# Patient Record
Sex: FEMALE | Race: WHITE | NOT HISPANIC OR LATINO | Employment: FULL TIME | URBAN - METROPOLITAN AREA
[De-identification: names, ages, dates, MRNs, and addresses within clinical notes are randomized per-mention and may not be internally consistent; named-entity substitution may affect disease eponyms.]

---

## 2019-09-14 ENCOUNTER — OFFICE VISIT (OUTPATIENT)
Dept: URGENT CARE | Facility: CLINIC | Age: 69
End: 2019-09-14
Payer: COMMERCIAL

## 2019-09-14 VITALS
HEART RATE: 71 BPM | BODY MASS INDEX: 42.86 KG/M2 | RESPIRATION RATE: 18 BRPM | DIASTOLIC BLOOD PRESSURE: 90 MMHG | SYSTOLIC BLOOD PRESSURE: 142 MMHG | OXYGEN SATURATION: 98 % | WEIGHT: 227 LBS | HEIGHT: 61 IN | TEMPERATURE: 98 F

## 2019-09-14 DIAGNOSIS — B96.89 ACUTE BACTERIAL BRONCHITIS: Primary | ICD-10-CM

## 2019-09-14 DIAGNOSIS — J20.8 ACUTE BACTERIAL BRONCHITIS: Primary | ICD-10-CM

## 2019-09-14 DIAGNOSIS — H10.12 ALLERGIC CONJUNCTIVITIS OF LEFT EYE: ICD-10-CM

## 2019-09-14 PROCEDURE — 99214 PR OFFICE/OUTPT VISIT, EST, LEVL IV, 30-39 MIN: ICD-10-PCS | Mod: S$GLB,,, | Performed by: PHYSICIAN ASSISTANT

## 2019-09-14 PROCEDURE — 99214 OFFICE O/P EST MOD 30 MIN: CPT | Mod: S$GLB,,, | Performed by: PHYSICIAN ASSISTANT

## 2019-09-14 RX ORDER — BENZONATATE 100 MG/1
100 CAPSULE ORAL EVERY 6 HOURS PRN
Qty: 60 CAPSULE | Refills: 1 | Status: SHIPPED | OUTPATIENT
Start: 2019-09-14 | End: 2020-02-18

## 2019-09-14 RX ORDER — PREDNISONE 20 MG/1
TABLET ORAL
Qty: 10 TABLET | Refills: 0 | Status: SHIPPED | OUTPATIENT
Start: 2019-09-14 | End: 2020-02-18 | Stop reason: SDUPTHER

## 2019-09-14 RX ORDER — AZELASTINE HYDROCHLORIDE 0.5 MG/ML
1 SOLUTION/ DROPS OPHTHALMIC 2 TIMES DAILY
Qty: 6 ML | Refills: 0 | Status: SHIPPED | OUTPATIENT
Start: 2019-09-14 | End: 2020-02-18

## 2019-09-14 RX ORDER — DOXYCYCLINE 100 MG/1
100 CAPSULE ORAL 2 TIMES DAILY
Qty: 14 CAPSULE | Refills: 0 | Status: SHIPPED | OUTPATIENT
Start: 2019-09-14 | End: 2019-09-21

## 2019-09-14 NOTE — PATIENT INSTRUCTIONS

## 2019-09-14 NOTE — PROGRESS NOTES
"Subjective:       Patient ID: Hallie Calvo is a 69 y.o. female.    Vitals:  height is 5' 1" (1.549 m) and weight is 103 kg (227 lb). Her oral temperature is 98 °F (36.7 °C). Her blood pressure is 142/90 (abnormal) and her pulse is 71. Her respiration is 18 and oxygen saturation is 98%.     Chief Complaint: Cough and Nasal Congestion    Pt presents today with non-productive cough X's 1 month. Pt has taken OTC cough meds with no relief. Pt has albuterol inhaler she has used a few times for relief. Pt has hx of bronchitis     Cough   This is a chronic problem. The current episode started more than 1 month ago. The problem has been unchanged. The problem occurs constantly. The cough is non-productive. Associated symptoms include postnasal drip. Pertinent negatives include no chest pain, chills, ear congestion, ear pain, eye redness, fever, headaches, heartburn, hemoptysis, myalgias, nasal congestion, rash, rhinorrhea, sore throat, shortness of breath, sweats, weight loss or wheezing. Nothing aggravates the symptoms. Treatments tried: notes. The treatment provided no relief. Her past medical history is significant for bronchitis. There is no history of asthma, bronchiectasis, COPD, emphysema, environmental allergies or pneumonia.       Constitution: Negative for chills, sweating, fatigue and fever.   HENT: Positive for postnasal drip. Negative for ear pain, congestion, sinus pain, sinus pressure, sore throat and voice change.    Neck: Negative for painful lymph nodes.   Cardiovascular: Negative for chest pain.   Eyes: Negative for eye redness.   Respiratory: Positive for cough. Negative for chest tightness, sputum production, bloody sputum, COPD, shortness of breath, stridor, wheezing and asthma.    Gastrointestinal: Negative for nausea, vomiting and heartburn.   Musculoskeletal: Negative for muscle ache.   Skin: Negative for rash.   Allergic/Immunologic: Negative for environmental allergies, seasonal allergies and " asthma.   Neurological: Negative for headaches.   Hematologic/Lymphatic: Negative for swollen lymph nodes.       Objective:      Physical Exam   Constitutional: She is oriented to person, place, and time. She appears well-developed and well-nourished. She is cooperative.  Non-toxic appearance. She does not appear ill. No distress.   HENT:   Head: Normocephalic and atraumatic.   Right Ear: Hearing, tympanic membrane, external ear and ear canal normal.   Left Ear: Hearing, tympanic membrane, external ear and ear canal normal.   Nose: No mucosal edema, rhinorrhea or nasal deformity. No epistaxis. Right sinus exhibits maxillary sinus tenderness. Right sinus exhibits no frontal sinus tenderness. Left sinus exhibits maxillary sinus tenderness. Left sinus exhibits no frontal sinus tenderness.   Mouth/Throat: Uvula is midline, oropharynx is clear and moist and mucous membranes are normal. No trismus in the jaw. Normal dentition. No uvula swelling. No posterior oropharyngeal erythema.   Eyes: Lids are normal. Right eye exhibits no exudate. Left eye exhibits no exudate. Left conjunctiva is injected. No scleral icterus.   Neck: Trachea normal, full passive range of motion without pain and phonation normal. Neck supple.   Cardiovascular: Normal rate, regular rhythm, normal heart sounds, intact distal pulses and normal pulses.   Pulmonary/Chest: Effort normal and breath sounds normal. No respiratory distress.   Abdominal: Soft. Normal appearance and bowel sounds are normal. She exhibits no distension. There is no tenderness.   Musculoskeletal: Normal range of motion. She exhibits no edema or deformity.   Neurological: She is alert and oriented to person, place, and time. She exhibits normal muscle tone. Coordination normal.   Skin: Skin is warm, dry and intact. She is not diaphoretic. No pallor.   Psychiatric: She has a normal mood and affect. Her speech is normal and behavior is normal. Judgment and thought content normal.  Cognition and memory are normal.   Nursing note and vitals reviewed.      Assessment:       1. Acute bacterial bronchitis    2. Allergic conjunctivitis of left eye        Plan:         Acute bacterial bronchitis    Allergic conjunctivitis of left eye    Other orders  -     doxycycline (VIBRAMYCIN) 100 MG Cap; Take 1 capsule (100 mg total) by mouth 2 (two) times daily. for 7 days  Dispense: 14 capsule; Refill: 0  -     azelastine (OPTIVAR) 0.05 % ophthalmic solution; Place 1 drop into the right eye 2 (two) times daily.  Dispense: 6 mL; Refill: 0  -     benzonatate (TESSALON PERLES) 100 MG capsule; Take 1 capsule (100 mg total) by mouth every 6 (six) hours as needed.  Dispense: 60 capsule; Refill: 1  -     predniSONE (DELTASONE) 20 MG tablet; Take 40mg for 2days, take 30mg for 2 days, take 20mg for 2 days, take 10mg for 2 days  Dispense: 10 tablet; Refill: 0     Discussed risks versus benefits of steroids including but not limited to:  Increased intra-ocular pressure, increased blood sugars, elevated blood pressure, arrhythmia, decreased immune system.  Patient would like to proceed.    You must understand that you've received an Urgent Care treatment only and that you may be released before all your medical problems are known or treated. You, the patient, will arrange for follow up care as instructed.  Follow up with your PCP or specialty clinic as directed in the next 1-2 weeks if not improved or as needed.  You can call [phone] to schedule an appointment with the appropriate provider.  If your condition worsens we recommend that you receive another evaluation at the emergency room immediately or contact your primary medical clinics after hours call service to discuss your concerns.  Please return here or go to the Emergency Department for any concerns or worsening of condition.

## 2019-10-29 PROBLEM — E66.01 CLASS 3 SEVERE OBESITY WITH BODY MASS INDEX (BMI) OF 40.0 TO 44.9 IN ADULT: Status: ACTIVE | Noted: 2019-10-29

## 2019-10-29 PROBLEM — E66.813 CLASS 3 SEVERE OBESITY WITH BODY MASS INDEX (BMI) OF 40.0 TO 44.9 IN ADULT: Status: ACTIVE | Noted: 2019-10-29

## 2020-05-13 ENCOUNTER — PATIENT MESSAGE (OUTPATIENT)
Dept: ADMINISTRATIVE | Facility: OTHER | Age: 70
End: 2020-05-13

## 2020-09-22 ENCOUNTER — PATIENT OUTREACH (OUTPATIENT)
Dept: OTHER | Facility: OTHER | Age: 70
End: 2020-09-22

## 2020-09-22 NOTE — LETTER
September 22, 2020     Hallie Calvo  [address] [facility] [address]       Dear Hallie,    Welcome to [facility]! Our goal is to make care effective, proactive and convenient by using data you send us from home to better treat your chronic conditions.                My name is Josephine Severino, and I am your dedicated Digital Medicine clinician. As an expert in medication management, I will help ensure that the medications you are taking continue to provide the intended benefits and help you reach your goals. You can reach me directly at [phone] or by sending me a message directly through your [facility] account.      I am Kiersten Melendez and I will be your health . My job is to help you identify lifestyle changes to improve your disease control. We will talk about nutrition, exercise, and other ways you may be able to adjust your current habits to better your health. Additionally, we will help ensure you are completing the tests and screenings that are necessary to help manage your conditions. You can reach me directly at [phone] or by sending me a message directly through your [facility] account.    Most importantly, YOU are at the center of this team. Together, we will work to improve your overall health and encourage you to meet your goals for a healthier lifestyle.     What we expect from YOU:  · Please take frequent home blood pressure measurements. We ask that you take at least 1 blood pressure reading per week, but more information will better help us get you know you. Be sure you rest for a few minutes before taking the reading in a quiet, comfortable place.     Be available to receive phone calls or [employer] messages, when appropriate, from your care team. Please let us know if there are any specific days or times that work best for us to reach you via phone.     Complete routine tests and screenings. Dont worry, we will help keep you on track!           What you should expect  from your Digital Medicine Care Team:   We will work with you to create a personalized plan of care and provide you with encouragement and education, including regarding lifestyle changes, that could help you manage your disease states.     We will adjust your current medications, if needed, and continue to monitor your long-term progress.     We will provide you and your physician with monthly progress reports after you have been in the program for more than 30 days.     We will send you reminders through [employer] and text messages to help ensure you do not miss any testing deadlines to help manage your disease states.    You will be able to reach us by phone or through your [employer] account by clicking our names under Care Team on the right side of the home screen.    We look forward to working with you to help manage your health,    Sincerely,    Your Digital Medicine Team    Please visit our websites to learn more:   · Hypertension: [email or web address]      Remember, we are not available for emergencies. If you have an emergency, please contact your doctors office directly or call [facility] on-[facility] ([phone] or [phone]) or 911.

## 2020-09-22 NOTE — PROGRESS NOTES
Digital Medicine: Health  Introduction    Introduced Hallie Calvo to Digital Medicine. Discussed health  role and recommended lifestyle modifications.    The history is provided by the patient.           Additional Enrollment Details: Patient reports that the blood pressure cuff pumps up too tight. 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 She feels that she's not putting it on too tight. It is really painful when she takes her reading. She is not sure she can take it. She will try to take it 2-3 times a week if she can.     HYPERTENSION  Explained hypertension digital medicine goals including BP goal less than or equal to 130/80mmHg, improved convenience of BP management and reduced risk of heart attack, kidney failure, stroke, eye disease, dementia, and death.      Explained non-pharmacologic therapies like low salt diet and physical activity can reduce blood pressure. .      Explained that we expect patient to submit several blood pressure readings per week at random times of the day, but at least 30 minutes after taking blood pressure medications. Instructed patient not to allow anyone else to use their blood pressure monitor and phone as data submitted is directly entered into medical record. Reviewed and confirmed appropriate blood pressure monitoring technique.         Patient's BP goal is less than or equal to 130/80.Patient's BP average is 158/75 mmHg, which is above goal, per 2017 ACC/AHA Hypertension Guidelines.    Explained to the patient that the Digital Medicine team is not available for emergencies. Advised patient call [facility] On Call ([phone] or [phone]) or [phone] if needed.               Diet-Assessed      Additional diet details: Encouraged to decrease sodium intake to <2,000 mg per day.       Physical Activity-Assessed      Additional physical activity details: Encouraged to increase exercise to at least 150 minutes per week.        Medication Adherence-Medication  Adherence not addressed.      Substance, Sleep, Stress-Not assessed      Provided patient education.  Reviewed Device Techniques.     Addressed patient questions and patient has my contact information if needed prior to next outreach. Patient verbalizes understanding.      Explained the importance of self-monitoring and medication adherence. Encouraged the patient to communicate with their health  for lifestyle modifications to help improve or maintain a healthy lifestyle.        Explained to the patient that the Digital Medicine team is not available for emergencies. Advised patient call [facility] ([phone] or [phone]) or 911 if needed.         There are no preventive care reminders to display for this patient.    Last 5 Patient Entered Readings                                      Current 30 Day Average: 158/75     Recent Readings 9/19/2020 9/18/2020    SBP (mmHg) 173 142    DBP (mmHg) 86 64    Pulse 75 67

## 2020-09-23 ENCOUNTER — PATIENT OUTREACH (OUTPATIENT)
Dept: OTHER | Facility: OTHER | Age: 70
End: 2020-09-23

## 2020-09-23 NOTE — PROGRESS NOTES
"Digital Medicine: Clinician Introduction    Hallie Calvo is a 70 y.o. female who is newly enrolled in the Digital Medicine Clinic.    Called patient regarding HDMP (Hypertension Digital Medicine Program)    HPI  Patient's BP average is currently 158/75 mmHg.    Patient states her cuff squeezes her arm too tightly even though it is the extra large because she has "big, flabby arms". She has another machine at home.     Patient is taking Propranolol 60 mg every evening. She is on it for tremors and BP. She states her tremor is "not that bad" but she used to be on atenolol for BP and was switched to propranolol. Her BP in the office is within goal.    The history is provided by the patient.      Review of patient's allergies indicates:  No Known Allergies  Completed Medication Reconciliation  Verified pharmacy information.    HYPERTENSION  Explained hypertension digital medicine goals including BP goal less than or equal to 130/80mmHg, improved convenience of BP management and reduced risk of heart attack, kidney failure, stroke, eye disease, dementia, and death.     Explained non-pharmacologic therapies like low salt diet and physical activity can reduce blood pressure.       Explained that we expect patient to submit several blood pressure readings per week at random times of the day, but at least 30 minutes after taking blood pressure medications. Instructed patient not to allow anyone else to use their blood pressure monitor and phone as data submitted is directly entered into medical record. Reviewed and confirmed appropriate blood pressure monitoring technique.         Reviewed signs/symptoms of hypertension (headache, changes in vision, chest pain, shortness of breath)   Reviewed signs/symptoms of hypotension (lightheaded, dizziness, weakness)     Patient's BP goal is less than or equal to 130/80. Patients BP average is 158/75 mmHg, which is above goal, per 2017 ACC/AHA Hypertension Guidelines.       Last 5 " Patient Entered Readings                                      Current 30 Day Average: 158/75     Recent Readings 9/19/2020 9/18/2020    SBP (mmHg) 173 142    DBP (mmHg) 86 64    Pulse 75 67                Depression Screening  Hallie Calvo did not answer the depression screening. She is in treatment for depression and is currently taking medication Patient feels that depression symptoms are currently controlled.   She is currently on Venlafaxine    Sleep Apnea Screening  Patient previously diagnosed with EVELYN and is not interested in a referral at this time.     She reports she is currently using CPAP for 5 hour(s) per night. EVELYN is managed effectively with CPAP use.  She uses her CPAP from 4-8 hours/night.     Medication Affordability Screening  Patient did not answer the medication affordability questionnaires. Patient is currently not having problems affording medications    Medication Adherence-Medication adherence was assessed.  Patient continue taking medication as prescribed.            ASSESSMENT(S)  Patients BP average is 158/75 mmHg, which is above goal. Patient's BP goal is less than or equal to 130/80 per 2017 ACC/AHA Hypertension Guidelines.     Hypertension Plan  Additional monitoring needed.  Continue current therapy.  Continue current diet/physical activity routine.  Provided patient education. Counseled patient to check with her manual machine and to enter the readings  Will call patient in a few weeks, sooner if needed. Patient knows to reach out at any time for any questions or concerns.        Addressed patient questions and patient has my contact information if needed prior to next outreach. Patient verbalizes understanding.      Explained the importance of self-monitoring and medication adherence. Encouraged the patient to communicate with their health  for lifestyle modifications to help improve or maintain a healthy lifestyle.        Sent link to [facility] [employer] webpages and  my contact information via [employer] for future questions.        Explained to the patient that the Digital Medicine team is not available for emergencies. Advised patient call [facility] ([phone] or [phone]) or [phone] if needed.           There are no preventive care reminders to display for this patient.     Current Medication Regimen:  Hypertension Medications             propranolol (INDERAL LA) 60 MG 24 hr capsule Take 1 capsule (60 mg total) by mouth once daily.          Josephine Severino, PharmD  Digital Medicine Clinical Pharmacist  [phone]

## 2020-10-14 ENCOUNTER — PATIENT OUTREACH (OUTPATIENT)
Dept: OTHER | Facility: OTHER | Age: 70
End: 2020-10-14

## 2020-10-27 ENCOUNTER — PATIENT OUTREACH (OUTPATIENT)
Dept: OTHER | Facility: OTHER | Age: 70
End: 2020-10-27

## 2020-10-27 DIAGNOSIS — I10 ESSENTIAL HYPERTENSION: Primary | ICD-10-CM

## 2020-10-27 NOTE — PROGRESS NOTES
Attempted to reach patient for routine follow up. Reviewed available blood pressure readings and labs. Per 2017 ACC/AHA Hypertension Guidelines, current 30-day average is 132/73 and is not at goal.     Last 5 Patient Entered Readings                                      Current 30 Day Average: 132/73     Recent Readings 10/25/2020 10/25/2020 10/21/2020 10/13/2020 10/9/2020    SBP (mmHg) 142 135 135 128 125    DBP (mmHg) 80 76 76 72 61    Pulse 94 61 61 66 73          BMP  Lab Results   Component Value Date     (L) 02/28/2020    K 4.6 02/28/2020     02/28/2020    CO2 26 02/28/2020    BUN 32 (H) 02/28/2020    CREATININE 0.96 02/28/2020    CALCIUM 9.2 02/28/2020    ANIONGAP 5 (L) 02/28/2020    ESTGFRAFRICA >60 02/28/2020    EGFRNONAA >60 02/28/2020       Left a voicemail and requested patient call back.    Will continue to monitor regularly. Will follow up in 2 weeks, sooner if blood pressure begins to trend up.    Josephine Severino, PharmD  Digital Medicine Clinical Pharmacist  [phone]

## 2020-11-24 RX ORDER — OLMESARTAN MEDOXOMIL 5 MG/1
5 TABLET ORAL DAILY
Qty: 30 TABLET | Refills: 5 | Status: SHIPPED | OUTPATIENT
Start: 2020-11-24 | End: 2021-03-24

## 2020-11-24 NOTE — PROGRESS NOTES
Digital Medicine: Clinician Follow-Up    Called patient for routine follow up regarding HDMP (Hypertension Digital Medicine Program)    HPI  Patients BP average is currently 133/75 mmHg.      The history is provided by the patient.      Review of patient's allergies indicates:  No Known Allergies  Follow-up reason(s): routine follow up.     Hypertension    Readings are trending down   Patient is not experiencing signs/symptoms of hypotension.  Patient is not experiencing signs/symptoms of hypertension.            Last 5 Patient Entered Readings                                      Current 30 Day Average: 133/75     Recent Readings 11/16/2020 11/15/2020 11/7/2020 11/2/2020 11/1/2020    SBP (mmHg) 129 122 150 120 132    DBP (mmHg) 82 74 66 67 81    Pulse 60 70 81 68 75                 Depression Screening  Did not address depression screening.    Sleep Apnea Screening    Did not address sleep apnea screening.     Medication Affordability Screening  Did not address medication affordability screening.     Medication Adherence-Medication adherence was assessed.          ASSESSMENT(S)  Patients BP average is 133/75 mmHg, which is above goal. Patient's BP goal is less than or equal to 130/80.     Hypertension Plan  Hypertension Medication Change. Initiate olmesartan 5 mg daily  Additional monitoring needed.  Continue current diet/physical activity routine.  Will call patient in a few weeks, sooner if needed. Patient knows to reach out at any time for any questions or concerns.        Addressed patient questions and patient has my contact information if needed prior to next outreach. Patient verbalizes understanding.             There are no preventive care reminders to display for this patient.  There are no preventive care reminders to display for this patient.      Hypertension Medications             propranoloL (INDERAL LA) 60 MG 24 hr capsule TAKE 1 CAPSULE BY MOUTH once daily          Josephine Severino, PharmD  Digital  Medicine Clinician  [phone]

## 2020-12-02 NOTE — PROGRESS NOTES
Digital Medicine: Health  Follow-Up    The history is provided by the patient.             Reason for review: Blood pressure at goal        Topics Covered on Call: physical activity and Diet    Additional Follow-up details: Average blood pressure 128/70. Blood pressure is below goal and stable.             Diet-Change    Patient reports eating or drinking the following: She hasn't been working on anything specific with her diet. They do a lot of pre packaged foods and processed foods. She does cook a lot at home. She used to watch her salt intake and will try to do that more. She gets some water during the day but mostly drinks coffee. Recommend decreasing coffee intake and increase water. She tries to incorporate protein and vegetables with her meals.       Physical Activity-Change      Additional physical activity details: She is not getting any exercise in. She is not motivated at all. She doesn't like walking. Her daughter is coming into town on the 10th and will make her exercise.       Medication Adherence-Medication adherence was asssessed.    Patient reported missing medication: once a week.    Patient identified the following reasons for non-adherence:  Patient forgets.        She will miss her medication once a week because she forgets and sometimes just doesn't want to take it.     Substance, Sleep, Stress-change  stress-not assessed  Details:  Intervention(s):    Sleep-not assessed  Details:  Intervention(s):    Alcohol -assessed  Details:She will have a drink three times a week.   Intervention(s):    Tobacco-Assessed  Details:No tobacco use   Intervention(s):          Provided patient education.       Addressed patient questions and patient has my contact information if needed prior to next outreach. Patient verbalizes understanding.             There are no preventive care reminders to display for this patient.      Last 5 Patient Entered Readings                                      Current 30  Day Average: 128/70     Recent Readings 11/29/2020 11/24/2020 11/16/2020 11/15/2020 11/7/2020    SBP (mmHg) 121 123 129 122 150    DBP (mmHg) 64 69 82 74 66    Pulse 72 65 60 70 81

## 2020-12-08 ENCOUNTER — PATIENT OUTREACH (OUTPATIENT)
Dept: OTHER | Facility: OTHER | Age: 70
End: 2020-12-08

## 2020-12-08 NOTE — PROGRESS NOTES
Digital Medicine: Clinician Follow-Up    Called patient for routine follow up regarding HDMP (Hypertension Digital Medicine Program) and follow up on olmesartan initiation.    HPI  Patients BP average is currently 119/72 mmHg.      The history is provided by the patient.   Follow-up reason(s): medication change follow-up.     Hypertension    Readings are trending down due to medication adherence.       Patient is not experiencing signs/symptoms of hypotension.  Patient is not experiencing signs/symptoms of hypertension.      Additional Follow-up details: Patient takes olmesartan 5 mg every evening. She reports no side effects or concerns.     Patient did make medication change.    Is patient tolerating med change? yes            Last 5 Patient Entered Readings                                      Current 30 Day Average: 119/72     Recent Readings 12/5/2020 11/29/2020 11/24/2020 11/16/2020 11/15/2020    SBP (mmHg) 99 121 123 129 122    DBP (mmHg) 72 64 69 82 74    Pulse 69 72 65 60 70                 Depression Screening  Did not address depression screening.    Sleep Apnea Screening    Did not address sleep apnea screening.     Medication Affordability Screening  Did not address medication affordability screening.     Medication Adherence-Medication adherence was assessed.          ASSESSMENT(S)  Patients BP average is 119/72 mmHg, which is at goal. Patient's BP goal is less than or equal to 130/80.    Hypertension Plan  Continue current therapy.  Continue current diet/physical activity routine.  Provided patient education. Counseled patient on hypotension and its treatment and encouraged her to let me know if it occurs  Will call patient in a few months, sooner if needed. Patient knows to reach out at any time for any questions or concerns.        Addressed patient questions and patient has my contact information if needed prior to next outreach. Patient verbalizes understanding.             There are no  preventive care reminders to display for this patient.  There are no preventive care reminders to display for this patient.      Hypertension Medications             olmesartan (BENICAR) 5 MG Tab Take 1 tablet (5 mg total) by mouth once daily.    propranoloL (INDERAL LA) 60 MG 24 hr capsule TAKE 1 CAPSULE BY MOUTH once daily        Josephine Severino, PharmD  Digital Medicine Clinician  [phone]

## 2021-03-24 DIAGNOSIS — I10 ESSENTIAL HYPERTENSION: ICD-10-CM

## 2021-03-24 RX ORDER — OLMESARTAN MEDOXOMIL 5 MG/1
TABLET ORAL
Qty: 30 TABLET | Refills: 5 | Status: SHIPPED | OUTPATIENT
Start: 2021-03-24 | End: 2021-10-22

## 2021-05-11 PROBLEM — R15.9 FECAL INCONTINENCE DUE TO ANORECTAL DISORDER: Status: ACTIVE | Noted: 2021-05-11

## 2021-05-11 PROBLEM — K62.9 FECAL INCONTINENCE DUE TO ANORECTAL DISORDER: Status: ACTIVE | Noted: 2021-05-11

## 2021-06-04 ENCOUNTER — TELEPHONE (OUTPATIENT)
Dept: SPINE | Facility: CLINIC | Age: 71
End: 2021-06-04

## 2021-06-15 ENCOUNTER — OFFICE VISIT (OUTPATIENT)
Dept: SPINE | Facility: CLINIC | Age: 71
End: 2021-06-15
Payer: COMMERCIAL

## 2021-06-15 VITALS
BODY MASS INDEX: 44.69 KG/M2 | HEIGHT: 61 IN | WEIGHT: 236.69 LBS | DIASTOLIC BLOOD PRESSURE: 87 MMHG | HEART RATE: 62 BPM | SYSTOLIC BLOOD PRESSURE: 137 MMHG

## 2021-06-15 DIAGNOSIS — M51.36 LUMBAR DEGENERATIVE DISC DISEASE: ICD-10-CM

## 2021-06-15 DIAGNOSIS — R42 DIZZINESS: Primary | ICD-10-CM

## 2021-06-15 DIAGNOSIS — M54.50 LEFT-SIDED LOW BACK PAIN WITHOUT SCIATICA, UNSPECIFIED CHRONICITY: ICD-10-CM

## 2021-06-15 DIAGNOSIS — R25.1 TREMOR: ICD-10-CM

## 2021-06-15 PROCEDURE — 99999 PR PBB SHADOW E&M-EST. PATIENT-LVL V: CPT | Mod: PBBFAC,,, | Performed by: PHYSICIAN ASSISTANT

## 2021-06-15 PROCEDURE — 99999 PR PBB SHADOW E&M-EST. PATIENT-LVL V: ICD-10-PCS | Mod: PBBFAC,,, | Performed by: PHYSICIAN ASSISTANT

## 2021-06-15 PROCEDURE — 99243 OFF/OP CNSLTJ NEW/EST LOW 30: CPT | Mod: S$GLB,,, | Performed by: PHYSICIAN ASSISTANT

## 2021-06-15 PROCEDURE — 99243 PR OFFICE CONSULTATION,LEVEL III: ICD-10-PCS | Mod: S$GLB,,, | Performed by: PHYSICIAN ASSISTANT

## 2021-06-15 RX ORDER — CETIRIZINE HYDROCHLORIDE 10 MG/1
10 TABLET ORAL NIGHTLY
COMMUNITY

## 2021-06-15 RX ORDER — CHOLECALCIFEROL (VITAMIN D3) 25 MCG
1000 TABLET ORAL NIGHTLY
COMMUNITY

## 2021-07-16 ENCOUNTER — TELEPHONE (OUTPATIENT)
Dept: NEUROLOGY | Facility: CLINIC | Age: 71
End: 2021-07-16

## 2021-07-20 RX ORDER — ESTRADIOL 0.1 MG/G
CREAM VAGINAL
COMMUNITY
Start: 2020-12-04 | End: 2021-07-20 | Stop reason: SDUPTHER

## 2021-07-21 RX ORDER — ESTRADIOL 0.1 MG/G
CREAM VAGINAL
Qty: 42.5 TUBE | Refills: 0 | Status: SHIPPED | OUTPATIENT
Start: 2021-07-21 | End: 2022-06-17 | Stop reason: SDUPTHER

## 2021-07-21 RX ORDER — NYSTATIN AND TRIAMCINOLONE ACETONIDE 100000; 1 [USP'U]/G; MG/G
CREAM TOPICAL
Qty: 30 G | Refills: 0 | Status: SHIPPED | OUTPATIENT
Start: 2021-07-21 | End: 2023-06-19 | Stop reason: SDUPTHER

## 2021-12-07 ENCOUNTER — PATIENT MESSAGE (OUTPATIENT)
Dept: SPINE | Facility: CLINIC | Age: 71
End: 2021-12-07
Payer: COMMERCIAL

## 2021-12-07 DIAGNOSIS — R42 DIZZINESS: Primary | ICD-10-CM

## 2021-12-07 DIAGNOSIS — R25.1 TREMOR: ICD-10-CM

## 2021-12-08 ENCOUNTER — PATIENT MESSAGE (OUTPATIENT)
Dept: SPINE | Facility: CLINIC | Age: 71
End: 2021-12-08
Payer: COMMERCIAL

## 2022-02-11 ENCOUNTER — OFFICE VISIT (OUTPATIENT)
Dept: NEUROLOGY | Facility: CLINIC | Age: 72
End: 2022-02-11
Payer: COMMERCIAL

## 2022-02-11 VITALS
RESPIRATION RATE: 18 BRPM | HEIGHT: 61 IN | SYSTOLIC BLOOD PRESSURE: 146 MMHG | WEIGHT: 238 LBS | DIASTOLIC BLOOD PRESSURE: 76 MMHG | TEMPERATURE: 98 F | HEART RATE: 68 BPM | BODY MASS INDEX: 44.93 KG/M2

## 2022-02-11 DIAGNOSIS — G47.33 OBSTRUCTIVE SLEEP APNEA: ICD-10-CM

## 2022-02-11 DIAGNOSIS — R42 DIZZINESS AND GIDDINESS: ICD-10-CM

## 2022-02-11 PROCEDURE — 3078F DIAST BP <80 MM HG: CPT | Mod: CPTII,S$GLB,, | Performed by: NURSE PRACTITIONER

## 2022-02-11 PROCEDURE — 4010F PR ACE/ARB THEARPY RXD/TAKEN: ICD-10-PCS | Mod: CPTII,S$GLB,, | Performed by: NURSE PRACTITIONER

## 2022-02-11 PROCEDURE — 1160F PR REVIEW ALL MEDS BY PRESCRIBER/CLIN PHARMACIST DOCUMENTED: ICD-10-PCS | Mod: CPTII,S$GLB,, | Performed by: NURSE PRACTITIONER

## 2022-02-11 PROCEDURE — 1160F RVW MEDS BY RX/DR IN RCRD: CPT | Mod: CPTII,S$GLB,, | Performed by: NURSE PRACTITIONER

## 2022-02-11 PROCEDURE — 3008F BODY MASS INDEX DOCD: CPT | Mod: CPTII,S$GLB,, | Performed by: NURSE PRACTITIONER

## 2022-02-11 PROCEDURE — 1126F AMNT PAIN NOTED NONE PRSNT: CPT | Mod: CPTII,S$GLB,, | Performed by: NURSE PRACTITIONER

## 2022-02-11 PROCEDURE — 99999 PR PBB SHADOW E&M-EST. PATIENT-LVL V: CPT | Mod: PBBFAC,,, | Performed by: NURSE PRACTITIONER

## 2022-02-11 PROCEDURE — 1159F PR MEDICATION LIST DOCUMENTED IN MEDICAL RECORD: ICD-10-PCS | Mod: CPTII,S$GLB,, | Performed by: NURSE PRACTITIONER

## 2022-02-11 PROCEDURE — 99202 OFFICE O/P NEW SF 15 MIN: CPT | Mod: S$GLB,,, | Performed by: NURSE PRACTITIONER

## 2022-02-11 PROCEDURE — 3078F PR MOST RECENT DIASTOLIC BLOOD PRESSURE < 80 MM HG: ICD-10-PCS | Mod: CPTII,S$GLB,, | Performed by: NURSE PRACTITIONER

## 2022-02-11 PROCEDURE — 1159F MED LIST DOCD IN RCRD: CPT | Mod: CPTII,S$GLB,, | Performed by: NURSE PRACTITIONER

## 2022-02-11 PROCEDURE — 3288F PR FALLS RISK ASSESSMENT DOCUMENTED: ICD-10-PCS | Mod: CPTII,S$GLB,, | Performed by: NURSE PRACTITIONER

## 2022-02-11 PROCEDURE — 4010F ACE/ARB THERAPY RXD/TAKEN: CPT | Mod: CPTII,S$GLB,, | Performed by: NURSE PRACTITIONER

## 2022-02-11 PROCEDURE — 3008F PR BODY MASS INDEX (BMI) DOCUMENTED: ICD-10-PCS | Mod: CPTII,S$GLB,, | Performed by: NURSE PRACTITIONER

## 2022-02-11 PROCEDURE — 3077F SYST BP >= 140 MM HG: CPT | Mod: CPTII,S$GLB,, | Performed by: NURSE PRACTITIONER

## 2022-02-11 PROCEDURE — 3288F FALL RISK ASSESSMENT DOCD: CPT | Mod: CPTII,S$GLB,, | Performed by: NURSE PRACTITIONER

## 2022-02-11 PROCEDURE — 1101F PT FALLS ASSESS-DOCD LE1/YR: CPT | Mod: CPTII,S$GLB,, | Performed by: NURSE PRACTITIONER

## 2022-02-11 PROCEDURE — 99999 PR PBB SHADOW E&M-EST. PATIENT-LVL V: ICD-10-PCS | Mod: PBBFAC,,, | Performed by: NURSE PRACTITIONER

## 2022-02-11 PROCEDURE — 1101F PR PT FALLS ASSESS DOC 0-1 FALLS W/OUT INJ PAST YR: ICD-10-PCS | Mod: CPTII,S$GLB,, | Performed by: NURSE PRACTITIONER

## 2022-02-11 PROCEDURE — 99202 PR OFFICE/OUTPT VISIT, NEW, LEVL II, 15-29 MIN: ICD-10-PCS | Mod: S$GLB,,, | Performed by: NURSE PRACTITIONER

## 2022-02-11 PROCEDURE — 3077F PR MOST RECENT SYSTOLIC BLOOD PRESSURE >= 140 MM HG: ICD-10-PCS | Mod: CPTII,S$GLB,, | Performed by: NURSE PRACTITIONER

## 2022-02-11 PROCEDURE — 1126F PR PAIN SEVERITY QUANTIFIED, NO PAIN PRESENT: ICD-10-PCS | Mod: CPTII,S$GLB,, | Performed by: NURSE PRACTITIONER

## 2022-02-11 NOTE — PROGRESS NOTES
NEUROLOGY  Outpatient CONSULT    [facility]  [address] [facility], [place] [address]  [phone] (office) / [phone] (fax)    Patient Name:  Hallie Calvo  :  1950  MR #:  [id number]  Acct #:  [id number]    Date of Neurology Consult: 2022  Name of Provider: SONNY Zaidi    Other Physicians:  Henrry Jacobo MD (Primary Care Physician); Morelia España PA-C (Referring)      Chief Complaint: Dizziness      History of Present Illness (HPI):  Hallie Calvo is a right handed 71 y.o. female.   Patient is here today for dizziness. Patient states it started a couple of months ago. She seems to think it can be positional but sometimes she could be sitting or standing. It has decreased in frequency overall. She does report that her vision has gotten worse. She denies spinning sensation but was wobbly on occasion. She has never fallen or passed out from dizziness. She did endorse occasional nausea. She was diagnosed with occular migraines in the past but this was intermittent and since resolved. The episodes vary from 5 secs to 10 minutes. She is unsure of any exacerbating factors and states alleviating factors include time as symptoms will resolve on their own.           Past Medical, Surgical, Family & Social History:   Past Medical History:   Diagnosis Date    Age-related nuclear cataract, bilateral 2016    Anemia     Arthritis     Chronic Left Knee Pain     Depressive disorder, not elsewhere classified     Hemorrhoids     [facility]    HTN (hypertension)     Hyperlipidemia     Insomnia     Left Leg Pain     Nonspecific abnormal electrocardiogram (ECG) (EKG)     Obstructive sleep apnea (adult) (pediatric)     uses cpap    Pica      Past Surgical History:   Procedure Laterality Date    BELT ABDOMINOPLASTY      CATARACT EXTRACTION      COLONOSCOPY N/A 2/10/2022    Procedure: COLONOSCOPY;  Surgeon: Avi Herman Jr., MD;  Location: [facility]  ENDO;  Service: Endoscopy;  Laterality: N/A;    COLONOSCOPY W/ POLYPECTOMY  2015    [facility]    CYSTOCELE REPAIR      ESOPHAGOGASTRODUODENOSCOPY      ESOPHAGOGASTRODUODENOSCOPY  10/2016    GASTRIC BYPASS      NEPHRECTOMY Left 1998    ORTHOPEDIC SURGERY Right 1990    x 5 for tib/fib fx right    steroid epidural  1998    x 2    TONSILLECTOMY, ADENOIDECTOMY      UMBILICAL HERNIA REPAIR      VENTRAL HERNIA REPAIR       Family History   Problem Relation Age of Onset    Stroke Father     Hypertension Father     Hyperlipidemia Father     Diabetes Father     Heart disease Father     Arthritis Father     Kidney disease Father     Thyroid disease Mother     Heart disease Mother     Alzheimer's disease Mother     Arthritis Mother     Depression Mother     Stomach cancer Maternal Grandmother     Diabetes Sister     Depression Sister     Arthritis Sister     Diabetes Brother     Depression Brother     Frontotemporal dementia Sister     Arthritis Sister     Arthritis Brother     COPD Brother     Ovarian cancer Maternal Aunt 40     Alcohol use:  reports current alcohol use.   (Of note, 0.6 oz = 1 beer or 6 oz = 10 beers).  Tobacco use:  reports that she has never smoked. She has never used smokeless tobacco.  Street drug use:  reports no history of drug use.  Allergies: Patient has no known allergies..    Home Medications:     Current Outpatient Medications:     BIOTIN ORAL, biotin  1,000 mg daily, Disp: , Rfl:     calcium carbonate (OS-NAVEEN) 600 mg calcium (1,500 mg) Tab, Calcium 600, Disp: , Rfl:     cetirizine (ZYRTEC) 10 MG tablet, Take 10 mg by mouth every evening., Disp: , Rfl:     esomeprazole (NEXIUM) 40 MG capsule, TAKE 1 CAPSULE BY MOUTH DAILY, Disp: 30 capsule, Rfl: 0    estradioL (ESTRACE) 0.01 % (0.1 mg/gram) vaginal cream, Insert 1 gram vaginally 3 x every week, Disp: 42.5 Tube, Rfl: 0    ferrous sulfate 325 mg (65 mg iron) Tab tablet, Take 325 mg by  "mouth daily with breakfast., Disp: , Rfl:     KRILL OIL ORAL, Take 1 capsule by mouth every evening., Disp: , Rfl:     meloxicam (MOBIC) 15 MG tablet, Take 1 tablet (15 mg total) by mouth daily as needed for Pain., Disp: 90 tablet, Rfl: 1    multivitamin (THERAGRAN) per tablet, Take 1 tablet by mouth once daily., Disp: , Rfl:     nystatin-triamcinolone (MYCOLOG II) cream, Apply to affected area 2 times daily, Disp: 30 g, Rfl: 0    olmesartan (BENICAR) 5 MG Tab, TAKE 1 TABLET BY MOUTH once daily, Disp: 30 tablet, Rfl: 5    propranoloL (INDERAL LA) 60 MG 24 hr capsule, TAKE 1 CAPSULE BY MOUTH once daily, Disp: 90 capsule, Rfl: 3    sucralfate (CARAFATE) 1 gram tablet, TAKE 1 TABLET BY MOUTH BEFORE BREAKFAST & AT BEDTIME, Disp: 90 tablet, Rfl: 3    temazepam (RESTORIL) 15 mg Cap, TAKE 1 CAPSULE BY MOUTH nightly AS NEEDED, Disp: 30 capsule, Rfl: 2    TURMERIC ORAL, Take 1 capsule by mouth every evening., Disp: , Rfl:     venlafaxine (EFFEXOR-XR) 150 MG Cp24, TAKE 1 CAPSULE BY MOUTH EVERY DAY, Disp: 90 capsule, Rfl: 1    vitamin D (VITAMIN D3) 1000 units Tab, Take 1,000 Units by mouth once daily., Disp: , Rfl:   No current facility-administered medications for this visit.    Physical Examination:  BP (!) 146/76 (BP Location: Right arm, Patient Position: Sitting, BP Method: Medium (Automatic))   Pulse 68   Temp 97.6 °F (36.4 °C) (Skin)   Resp 18   Ht 5' 1" (1.549 m)   Wt 108 kg (237 lb 15.8 oz)   BMI 44.97 kg/m²     GENERAL:  General appearance: Well, non-toxic appearing.  No apparent distress.  Neck: supple.  .    MENTAL STATUS:  Alertness, attention span & concentration: normal.  Language: normal.  Orientation to self, place & time:  normal.  Memory, recent & remote: normal.  Fund of knowledge: normal.      SPEECH:  Clear and fluent.  Follows complex commands.    CRANIAL NERVES:  Cranial Nerves II-XII were examined.  II - Visual fields: normal.  III, IV, VI: PERRL, EOMI, No ptosis, No nystagmus.  V - " Facial sensation: normal.  VII - Face symmetry & mobility: Due to Covid-19 recommended precautions, patient wearing facial mask; mouth and nose not examined.  VIII - Hearing: normal.  IX, X - Palate: Due to Covid-19 recommended precautions, patient wearing facial mask; mouth and nose not examined.  XI - Shoulder shrug: normal.  XII - Tongue protrusion: Due to Covid-19 recommended precautions, patient wearing facial mask; mouth and nose not examined.    GROSS MOTOR:  Gait & station: non focal; fair tandem  Tone: normal.  Abnormal movements: none.  Finger-nose: normal.  Rapid alternating movements: normal.  Pronator drift: normal      MUSCLE STRENGTH:     Fascics Atrophy RIGHT    LEFT Atrophy Fascics     5 Neck Ext. 5       5 Neck Flex 5       5 Biceps 5       5 Triceps 5       5 Forearm.Pr. 5                5 Iliopsoas flex    5       5 Hip Abduct 5       5 Hip Adduct 5       5 Quads 5       5 Hams 5       5 Dorsiflex 5       5 Plantar Flex 5       REFLEXES:    RIGHT Reflex   LEFT   2+ Biceps 2+   2+ Brachiorad. 2+        2+ Patellar 2+     SENSORY:  Light touch: Normal throughout.            Diagnostic Data Reviewed:     Component      Latest Ref Rng & Units 9/27/2021   WBC      3.90 - 12.70 K/uL 6.16   RBC      4.00 - 5.40 M/uL 4.05   Hemoglobin      12.0 - 16.0 g/dL 12.0   Hematocrit      37.0 - 48.5 % 37.3   MCV      82 - 98 fL 92   MCH      27.0 - 31.0 pg 29.6   MCHC      32.0 - 36.0 g/dL 32.2   RDW      11.5 - 14.5 % 12.4   Platelets      150 - 450 K/uL 215   MPV      9.2 - 12.9 fL 10.4   Immature Granulocytes      0.0 - 0.5 % 0.3   Gran # (ANC)      1.8 - 7.7 K/uL 3.4   Immature Grans (Abs)      0.00 - 0.04 K/uL 0.02   Lymph #      1.0 - 4.8 K/uL 1.9   Mono #      0.3 - 1.0 K/uL 0.7   Eos #      0.0 - 0.5 K/uL 0.1   Baso #      0.00 - 0.20 K/uL 0.06   nRBC      0 /100 WBC 0   Gran %      38.0 - 73.0 % 54.4   Lymph %      18.0 - 48.0 % 30.7   Mono %      4.0 - 15.0 % 12.0   Eosinophil %      0.0 - 8.0 % 1.6  "  Basophil %      0.0 - 1.9 % 1.0   Differential Method       Automated   Sodium      136 - 145 mmol/L 138   Potassium      3.5 - 5.1 mmol/L 4.4   Chloride      95 - 110 mmol/L 103   CO2      22 - 31 mmol/L 28   Glucose      70 - 110 mg/dL 100   BUN      7 - 18 mg/dL 18   Creatinine      0.50 - 1.40 mg/dL 0.89   Calcium      8.4 - 10.2 mg/dL 9.2   PROTEIN TOTAL      6.0 - 8.4 g/dL 7.3   Albumin      3.5 - 5.2 g/dL 4.2   BILIRUBIN TOTAL      0.2 - 1.3 mg/dL 0.3   Alkaline Phosphatase      38 - 145 U/L 89   AST      14 - 36 U/L 31   ALT      0 - 35 U/L 17   Anion Gap      8 - 16 mmol/L 7 (L)   eGFR if African American      >60 mL/min/1.73 m:2 >60   eGFR if non African American      >60 mL/min/1.73 m:2 >60             Assessment and Plan:  Hallie Calvo is a 71 y.o. female.    Problem List Items Addressed This Visit        Other    Obstructive sleep apnea    Current Assessment & Plan     Reports being compliant with CPAP ~ 5 nights out of the week.          Dizziness and giddiness    Current Assessment & Plan     Reports of "wobbly" sensation and intermittent leaning to the right or left side when ambulating.   Onset ~ 2-3 months ago and has improved overall .   Pt denies spinning sensation but does endorse associated nausea occasionally as well as visual changes. No LOC, headaches, or focal weakness  Obtain MRI brain scan r/o central component  Recommend pt f/u with ophthalmology as she has not see them in ~ 6-7 years.   Referral to balance therapy  Can consider ENT referral in the future but may be unrevealing.                                Important to note, also  has a past medical history of Age-related nuclear cataract, bilateral (05/2016), Anemia, Arthritis, Chronic Left Knee Pain, Depressive disorder, not elsewhere classified, Hemorrhoids, HTN (hypertension), Hyperlipidemia, Insomnia, Left Leg Pain, Nonspecific abnormal electrocardiogram (ECG) (EKG), Obstructive sleep apnea (adult) (pediatric), and " Pica.            The patient will return to clinic as needed        All questions were answered and patient is comfortable with the plan.       Thank you very much for the opportunity to assist in this patient's care.    If you have any questions or concerns, please do not hesitate to contact me at any time.    Sincerely,     SONNY Zaidi  [facility] [place]         I spent a total of 28 minutes on the day of the visit.This includes face to face time and non-face to face time preparing to see the patient (eg, review of tests), Obtaining and/or reviewing separately obtained history, Documenting clinical information in the electronic or other health record, Independently interpreting resultsand communicating results to the patient/family/caregiver, or Care coordination.

## 2022-04-22 ENCOUNTER — TELEPHONE (OUTPATIENT)
Dept: SPINE | Facility: CLINIC | Age: 72
End: 2022-04-22
Payer: COMMERCIAL

## 2022-04-22 NOTE — TELEPHONE ENCOUNTER
From [facility].  Spoke with patient.  This is a Worker's comp claim.  She has already been in touch with [facility] and waiting approval.

## 2022-05-10 ENCOUNTER — OFFICE VISIT (OUTPATIENT)
Dept: SPINE | Facility: CLINIC | Age: 72
End: 2022-05-10
Payer: COMMERCIAL

## 2022-05-10 VITALS
DIASTOLIC BLOOD PRESSURE: 84 MMHG | BODY MASS INDEX: 46.43 KG/M2 | WEIGHT: 245.94 LBS | HEART RATE: 62 BPM | HEIGHT: 61 IN | SYSTOLIC BLOOD PRESSURE: 140 MMHG

## 2022-05-10 DIAGNOSIS — M51.36 LUMBAR DEGENERATIVE DISC DISEASE: Primary | ICD-10-CM

## 2022-05-10 DIAGNOSIS — M54.50 LEFT-SIDED LOW BACK PAIN WITHOUT SCIATICA, UNSPECIFIED CHRONICITY: ICD-10-CM

## 2022-05-10 PROCEDURE — 1125F PR PAIN SEVERITY QUANTIFIED, PAIN PRESENT: ICD-10-PCS | Mod: CPTII,S$GLB,, | Performed by: PHYSICIAN ASSISTANT

## 2022-05-10 PROCEDURE — 1159F PR MEDICATION LIST DOCUMENTED IN MEDICAL RECORD: ICD-10-PCS | Mod: CPTII,S$GLB,, | Performed by: PHYSICIAN ASSISTANT

## 2022-05-10 PROCEDURE — 3077F SYST BP >= 140 MM HG: CPT | Mod: CPTII,S$GLB,, | Performed by: PHYSICIAN ASSISTANT

## 2022-05-10 PROCEDURE — 1160F PR REVIEW ALL MEDS BY PRESCRIBER/CLIN PHARMACIST DOCUMENTED: ICD-10-PCS | Mod: CPTII,S$GLB,, | Performed by: PHYSICIAN ASSISTANT

## 2022-05-10 PROCEDURE — 3008F BODY MASS INDEX DOCD: CPT | Mod: CPTII,S$GLB,, | Performed by: PHYSICIAN ASSISTANT

## 2022-05-10 PROCEDURE — 1160F RVW MEDS BY RX/DR IN RCRD: CPT | Mod: CPTII,S$GLB,, | Performed by: PHYSICIAN ASSISTANT

## 2022-05-10 PROCEDURE — 3288F PR FALLS RISK ASSESSMENT DOCUMENTED: ICD-10-PCS | Mod: CPTII,S$GLB,, | Performed by: PHYSICIAN ASSISTANT

## 2022-05-10 PROCEDURE — 99999 PR PBB SHADOW E&M-EST. PATIENT-LVL IV: ICD-10-PCS | Mod: PBBFAC,,, | Performed by: PHYSICIAN ASSISTANT

## 2022-05-10 PROCEDURE — 1125F AMNT PAIN NOTED PAIN PRSNT: CPT | Mod: CPTII,S$GLB,, | Performed by: PHYSICIAN ASSISTANT

## 2022-05-10 PROCEDURE — 3288F FALL RISK ASSESSMENT DOCD: CPT | Mod: CPTII,S$GLB,, | Performed by: PHYSICIAN ASSISTANT

## 2022-05-10 PROCEDURE — 1100F PTFALLS ASSESS-DOCD GE2>/YR: CPT | Mod: CPTII,S$GLB,, | Performed by: PHYSICIAN ASSISTANT

## 2022-05-10 PROCEDURE — 1100F PR PT FALLS ASSESS DOC 2+ FALLS/FALL W/INJURY/YR: ICD-10-PCS | Mod: CPTII,S$GLB,, | Performed by: PHYSICIAN ASSISTANT

## 2022-05-10 PROCEDURE — 99243 OFF/OP CNSLTJ NEW/EST LOW 30: CPT | Mod: S$GLB,,, | Performed by: PHYSICIAN ASSISTANT

## 2022-05-10 PROCEDURE — 3008F PR BODY MASS INDEX (BMI) DOCUMENTED: ICD-10-PCS | Mod: CPTII,S$GLB,, | Performed by: PHYSICIAN ASSISTANT

## 2022-05-10 PROCEDURE — 3079F DIAST BP 80-89 MM HG: CPT | Mod: CPTII,S$GLB,, | Performed by: PHYSICIAN ASSISTANT

## 2022-05-10 PROCEDURE — 3079F PR MOST RECENT DIASTOLIC BLOOD PRESSURE 80-89 MM HG: ICD-10-PCS | Mod: CPTII,S$GLB,, | Performed by: PHYSICIAN ASSISTANT

## 2022-05-10 PROCEDURE — 4010F ACE/ARB THERAPY RXD/TAKEN: CPT | Mod: CPTII,S$GLB,, | Performed by: PHYSICIAN ASSISTANT

## 2022-05-10 PROCEDURE — 3077F PR MOST RECENT SYSTOLIC BLOOD PRESSURE >= 140 MM HG: ICD-10-PCS | Mod: CPTII,S$GLB,, | Performed by: PHYSICIAN ASSISTANT

## 2022-05-10 PROCEDURE — 4010F PR ACE/ARB THEARPY RXD/TAKEN: ICD-10-PCS | Mod: CPTII,S$GLB,, | Performed by: PHYSICIAN ASSISTANT

## 2022-05-10 PROCEDURE — 1159F MED LIST DOCD IN RCRD: CPT | Mod: CPTII,S$GLB,, | Performed by: PHYSICIAN ASSISTANT

## 2022-05-10 PROCEDURE — 99999 PR PBB SHADOW E&M-EST. PATIENT-LVL IV: CPT | Mod: PBBFAC,,, | Performed by: PHYSICIAN ASSISTANT

## 2022-05-10 PROCEDURE — 99243 PR OFFICE CONSULTATION,LEVEL III: ICD-10-PCS | Mod: S$GLB,,, | Performed by: PHYSICIAN ASSISTANT

## 2022-05-10 RX ORDER — TIZANIDINE 4 MG/1
4 TABLET ORAL NIGHTLY PRN
Qty: 40 TABLET | Refills: 0 | Status: SHIPPED | OUTPATIENT
Start: 2022-05-10

## 2022-05-24 PROBLEM — S63.276A: Status: ACTIVE | Noted: 2022-05-24

## 2022-05-24 PROBLEM — M25.60 RANGE OF MOTION DEFICIT: Status: ACTIVE | Noted: 2022-05-24

## 2022-07-27 PROBLEM — M25.60 RANGE OF MOTION DEFICIT: Status: RESOLVED | Noted: 2022-05-24 | Resolved: 2022-07-27

## 2022-07-27 PROBLEM — S63.276A: Status: RESOLVED | Noted: 2022-05-24 | Resolved: 2022-07-27

## 2023-02-09 PROBLEM — I20.89 ANGINAL EQUIVALENT: Status: ACTIVE | Noted: 2023-02-09

## 2023-03-21 PROBLEM — Z03.89 CORONARY ARTERY DISEASE EXCLUDED: Status: ACTIVE | Noted: 2023-03-21

## 2023-03-21 PROBLEM — R94.39 ABNORMAL STRESS TEST: Status: ACTIVE | Noted: 2023-03-21

## 2023-04-06 PROBLEM — I20.89 ANGINAL EQUIVALENT: Status: RESOLVED | Noted: 2023-02-09 | Resolved: 2023-04-06

## 2023-04-06 PROBLEM — R06.09 DOE (DYSPNEA ON EXERTION): Status: ACTIVE | Noted: 2023-04-06

## 2023-04-06 PROBLEM — R94.39 ABNORMAL STRESS TEST: Status: RESOLVED | Noted: 2023-03-21 | Resolved: 2023-04-06

## 2023-06-19 ENCOUNTER — OFFICE VISIT (OUTPATIENT)
Dept: OBSTETRICS AND GYNECOLOGY | Facility: CLINIC | Age: 73
End: 2023-06-19
Payer: COMMERCIAL

## 2023-06-19 VITALS
SYSTOLIC BLOOD PRESSURE: 158 MMHG | BODY MASS INDEX: 45.61 KG/M2 | DIASTOLIC BLOOD PRESSURE: 90 MMHG | WEIGHT: 241.38 LBS

## 2023-06-19 DIAGNOSIS — Z01.419 ENCOUNTER FOR ANNUAL ROUTINE GYNECOLOGICAL EXAMINATION: ICD-10-CM

## 2023-06-19 DIAGNOSIS — Z12.31 ENCOUNTER FOR SCREENING MAMMOGRAM FOR MALIGNANT NEOPLASM OF BREAST: ICD-10-CM

## 2023-06-19 DIAGNOSIS — Z12.4 SCREENING FOR MALIGNANT NEOPLASM OF CERVIX: Primary | ICD-10-CM

## 2023-06-19 PROCEDURE — 3080F DIAST BP >= 90 MM HG: CPT | Mod: CPTII,S$GLB,, | Performed by: OBSTETRICS & GYNECOLOGY

## 2023-06-19 PROCEDURE — 1126F PR PAIN SEVERITY QUANTIFIED, NO PAIN PRESENT: ICD-10-PCS | Mod: CPTII,S$GLB,, | Performed by: OBSTETRICS & GYNECOLOGY

## 2023-06-19 PROCEDURE — 1100F PR PT FALLS ASSESS DOC 2+ FALLS/FALL W/INJURY/YR: ICD-10-PCS | Mod: CPTII,S$GLB,, | Performed by: OBSTETRICS & GYNECOLOGY

## 2023-06-19 PROCEDURE — 3288F PR FALLS RISK ASSESSMENT DOCUMENTED: ICD-10-PCS | Mod: CPTII,S$GLB,, | Performed by: OBSTETRICS & GYNECOLOGY

## 2023-06-19 PROCEDURE — 3008F BODY MASS INDEX DOCD: CPT | Mod: CPTII,S$GLB,, | Performed by: OBSTETRICS & GYNECOLOGY

## 2023-06-19 PROCEDURE — 4010F PR ACE/ARB THEARPY RXD/TAKEN: ICD-10-PCS | Mod: CPTII,S$GLB,, | Performed by: OBSTETRICS & GYNECOLOGY

## 2023-06-19 PROCEDURE — 99387 INIT PM E/M NEW PAT 65+ YRS: CPT | Mod: S$GLB,,, | Performed by: OBSTETRICS & GYNECOLOGY

## 2023-06-19 PROCEDURE — 4010F ACE/ARB THERAPY RXD/TAKEN: CPT | Mod: CPTII,S$GLB,, | Performed by: OBSTETRICS & GYNECOLOGY

## 2023-06-19 PROCEDURE — 88175 CYTOPATH C/V AUTO FLUID REDO: CPT | Performed by: OBSTETRICS & GYNECOLOGY

## 2023-06-19 PROCEDURE — 3080F PR MOST RECENT DIASTOLIC BLOOD PRESSURE >= 90 MM HG: ICD-10-PCS | Mod: CPTII,S$GLB,, | Performed by: OBSTETRICS & GYNECOLOGY

## 2023-06-19 PROCEDURE — 1159F MED LIST DOCD IN RCRD: CPT | Mod: CPTII,S$GLB,, | Performed by: OBSTETRICS & GYNECOLOGY

## 2023-06-19 PROCEDURE — 99999 PR PBB SHADOW E&M-EST. PATIENT-LVL V: CPT | Mod: PBBFAC,,, | Performed by: OBSTETRICS & GYNECOLOGY

## 2023-06-19 PROCEDURE — 1100F PTFALLS ASSESS-DOCD GE2>/YR: CPT | Mod: CPTII,S$GLB,, | Performed by: OBSTETRICS & GYNECOLOGY

## 2023-06-19 PROCEDURE — 1160F RVW MEDS BY RX/DR IN RCRD: CPT | Mod: CPTII,S$GLB,, | Performed by: OBSTETRICS & GYNECOLOGY

## 2023-06-19 PROCEDURE — 99387 PR PREVENTIVE VISIT,NEW,65 & OVER: ICD-10-PCS | Mod: S$GLB,,, | Performed by: OBSTETRICS & GYNECOLOGY

## 2023-06-19 PROCEDURE — 3077F PR MOST RECENT SYSTOLIC BLOOD PRESSURE >= 140 MM HG: ICD-10-PCS | Mod: CPTII,S$GLB,, | Performed by: OBSTETRICS & GYNECOLOGY

## 2023-06-19 PROCEDURE — 1160F PR REVIEW ALL MEDS BY PRESCRIBER/CLIN PHARMACIST DOCUMENTED: ICD-10-PCS | Mod: CPTII,S$GLB,, | Performed by: OBSTETRICS & GYNECOLOGY

## 2023-06-19 PROCEDURE — 3077F SYST BP >= 140 MM HG: CPT | Mod: CPTII,S$GLB,, | Performed by: OBSTETRICS & GYNECOLOGY

## 2023-06-19 PROCEDURE — 1159F PR MEDICATION LIST DOCUMENTED IN MEDICAL RECORD: ICD-10-PCS | Mod: CPTII,S$GLB,, | Performed by: OBSTETRICS & GYNECOLOGY

## 2023-06-19 PROCEDURE — 3008F PR BODY MASS INDEX (BMI) DOCUMENTED: ICD-10-PCS | Mod: CPTII,S$GLB,, | Performed by: OBSTETRICS & GYNECOLOGY

## 2023-06-19 PROCEDURE — 99999 PR PBB SHADOW E&M-EST. PATIENT-LVL V: ICD-10-PCS | Mod: PBBFAC,,, | Performed by: OBSTETRICS & GYNECOLOGY

## 2023-06-19 PROCEDURE — 1126F AMNT PAIN NOTED NONE PRSNT: CPT | Mod: CPTII,S$GLB,, | Performed by: OBSTETRICS & GYNECOLOGY

## 2023-06-19 PROCEDURE — 3288F FALL RISK ASSESSMENT DOCD: CPT | Mod: CPTII,S$GLB,, | Performed by: OBSTETRICS & GYNECOLOGY

## 2023-06-19 RX ORDER — NYSTATIN AND TRIAMCINOLONE ACETONIDE 100000; 1 [USP'U]/G; MG/G
CREAM TOPICAL 2 TIMES DAILY
Qty: 60 G | Refills: 0 | Status: SHIPPED | OUTPATIENT
Start: 2023-06-19

## 2023-06-19 RX ORDER — MIRABEGRON 50 MG/1
1 TABLET, FILM COATED, EXTENDED RELEASE ORAL
COMMUNITY
Start: 2023-05-23

## 2023-06-19 RX ORDER — OXYBUTYNIN CHLORIDE 5 MG/1
5 TABLET ORAL 2 TIMES DAILY
COMMUNITY
Start: 2023-05-25

## 2023-06-19 NOTE — PROGRESS NOTES
Chief Complaint   Patient presents with    [facility]       History and Physical:  No LMP recorded. Patient is postmenopausal.       Hallie Calvo is a 73 y.o.  WF who presents today for her routine annual GYN exam. The patient has no Gynecology complaints today.       Allergies: Review of patient's allergies indicates:  No Known Allergies    Past Medical History:   Diagnosis Date    Age-related nuclear cataract, bilateral 2016    Anemia     Arthritis     Chronic Left Knee Pain     Depressive disorder, not elsewhere classified     Encounter for blood transfusion     Hemorrhoids     [facility]    History of kidney stones     HTN (hypertension)     Hyperlipidemia     Insomnia     Left Leg Pain     Nonspecific abnormal electrocardiogram (ECG) (EKG)     Obstructive sleep apnea (adult) (pediatric)     uses cpap    Pica     Renal disorder     right solitary kidney; left nephrectomy       Past Surgical History:   Procedure Laterality Date    BELT ABDOMINOPLASTY      CATARACT EXTRACTION      COLONOSCOPY N/A 2/10/2022    Procedure: COLONOSCOPY;  Surgeon: Avi Herman Jr., MD;  Location: [facility];  Service: Endoscopy;  Laterality: N/A;    COLONOSCOPY W/ POLYPECTOMY      consult from [facility]    CYSTOCELE REPAIR      ESOPHAGOGASTRODUODENOSCOPY      ESOPHAGOGASTRODUODENOSCOPY  10/2016    GASTRIC BYPASS      LEFT HEART CATHETERIZATION Left 3/21/2023    Procedure: Left heart cath;  Surgeon: Tristan Keith MD;  Location: [facility] CATH;  Service: Cardiovascular;  Laterality: Left;    NEPHRECTOMY Left     ORTHOPEDIC SURGERY Right 1990    x 5 for tib/fib fx right    steroid epidural  1998    x 2    TONSILLECTOMY, ADENOIDECTOMY      UMBILICAL HERNIA REPAIR      VENTRAL HERNIA REPAIR         MEDS:   Current Outpatient Medications on File Prior to Visit   Medication Sig Dispense Refill    BIOTIN ORAL every evening.      calcium carbonate (OS-NAVEEN) 600 mg calcium  (1,500 mg) Tab every evening.      cetirizine (ZYRTEC) 10 MG tablet Take 10 mg by mouth every evening.      diphenhydrAMINE (BENADRYL) 50 MG capsule Take 50 mg by mouth every 6 (six) hours as needed for Itching.      esomeprazole (NEXIUM) 40 MG capsule Take 1 capsule (40 mg total) by mouth once daily. 30 capsule 6    estradioL (ESTRACE) 0.01 % (0.1 mg/gram) vaginal cream Insert 1 gram vaginally 3 x every week 42.5 each 0    ferrous sulfate 325 mg (65 mg iron) Tab tablet Take 325 mg by mouth every evening.      KRILL OIL ORAL Take 1 capsule by mouth every evening.      meloxicam (MOBIC) 15 MG tablet Take 1 tablet (15 mg total) by mouth daily as needed for Pain. (Patient taking differently: Take 15 mg by mouth daily as needed for Pain. prn) 90 tablet 1    multivitamin (THERAGRAN) per tablet Take 1 tablet by mouth every evening.      MYRBETRIQ 50 mg Tb24 Take 1 tablet by mouth.      olmesartan (BENICAR) 5 MG Tab Take 1 tablet (5 mg total) by mouth once daily. 30 tablet 5    oxybutynin (DITROPAN) 5 MG Tab Take 5 mg by mouth 2 (two) times daily.      propranoloL (INDERAL LA) 60 MG 24 hr capsule Take 1 capsule (60 mg total) by mouth once daily. 90 capsule 3    rosuvastatin (CRESTOR) 10 MG tablet Take 1 tablet (10 mg total) by mouth once daily. 90 tablet 3    temazepam (RESTORIL) 15 mg Cap Take 1 capsule (15 mg total) by mouth nightly as needed. 30 capsule 2    tiZANidine (ZANAFLEX) 4 MG tablet Take 1 tablet (4 mg total) by mouth nightly as needed (muscle spasms/ pain). 40 tablet 0    tramadol-acetaminophen 37.5-325 mg (ULTRACET) 37.5-325 mg Tab Take 1 tablet by mouth every 6 (six) hours as needed for Pain. Quantity prescribed more than 7 day supply? Yes, quantity medically necessary 30 tablet 1    TURMERIC ORAL Take 1 capsule by mouth every evening.      venlafaxine (EFFEXOR-XR) 150 MG Cp24 Take 1 capsule (150 mg total) by mouth once daily. 90 capsule 1    vitamin D (VITAMIN D3) 1000 units Tab Take 1,000 Units by mouth  every evening.      vibegron (GEMTESA) 75 mg Tab Take 75 mg by mouth once daily.      [DISCONTINUED] nystatin-triamcinolone (MYCOLOG II) cream Apply to affected area 2 times daily 30 g 0     No current facility-administered medications on file prior to visit.       OB History          3    Para   3    Term   3            AB        Living             SAB        IAB        Ectopic        Multiple        Live Births                     Social History     Socioeconomic History    Marital status:      Spouse name: Michael    Number of children: 3   Occupational History    Occupation: Nurse   Tobacco Use    Smoking status: Never    Smokeless tobacco: Never   Substance and Sexual Activity    Alcohol use: Yes     Alcohol/week: 4.0 standard drinks     Types: 4 Glasses of wine per week     Comment: socially    Drug use: No    Sexual activity: Not Currently     Partners: Male       Family History   Problem Relation Age of Onset    Thyroid disease Mother     Heart disease Mother     Alzheimer's disease Mother     Arthritis Mother     Depression Mother     Stroke Father     Hypertension Father     Hyperlipidemia Father     Diabetes Father     Heart disease Father     Arthritis Father     Kidney disease Father         dialysis    Diabetes Sister     Depression Sister     Arthritis Sister     Frontotemporal dementia Sister     Arthritis Sister     Diabetes Brother     Depression Brother     Arthritis Brother     COPD Brother     Ovarian cancer Maternal Aunt 40    Stomach cancer Maternal Grandmother     Ovarian cancer Other 40        cousin         Past medical and surgical history reviewed.   I have reviewed the patient's medical history in detail and updated the computerized patient record.        Review of System:   General: no chills, fever, night sweats, weight gain or weight loss  Psychological: no depression or suicidal ideation  Breasts: no new or changing breast lumps, nipple discharge or  masses.  Respiratory: no cough, shortness of breath, or wheezing  Cardiovascular: no chest pain or dyspnea on exertion  Gastrointestinal: no abdominal pain, change in bowel habits, or black or bloody stools  Genito-Urinary: no incontinence, urinary frequency/urgency or vulvar/vaginal symptoms, pelvic pain or abnormal vaginal bleeding.  Musculoskeletal: no gait disturbance or muscular weakness      Physical Exam:   BP (!) 158/90   Wt 109.5 kg (241 lb 6.5 oz)   BMI 45.61 kg/m²   Constitutional: She appears alert and responsive. She appears well-developed, well-groomed, and well-nourished. No distress.   HENT:   Head: Normocephalic and atraumatic.   Eyes: Conjunctivae and EOM are normal. No scleral icterus.   Neck: Symmetrical. Normal range of motion. Neck supple. No tracheal deviation present. THYROID:  without masses or tenderness.  Cardiovascular: Normal rate, no rhythm abnormality noted. Extremities without swelling or edema, warm.    Pulmonary/Chest: Normal respiratory Effort. No distress or retractions. She exhibits no tenderness.  Breasts: are symmetrical.no masses    Right breast exhibits no inverted nipple, no mass, no nipple discharge, no skin change and no tenderness.   Left breast exhibits no inverted nipple, no mass, no nipple discharge, no skin change and no tenderness.  Abdominal: Soft. She exhibits no distension, hernias or masses. There is no tenderness. No enlargement of liver edge or spleen.  There is no rebound and no guarding.   Genitourinary:    External rectal exam shows no thrombosed external hemorrhoids, no lesions.     Pelvic exam was performed with patient supine.   No labial fusion, and symmetrical.    There is no rash, lesion or injury on the right labia.   There is no rash, lesion or injury on the left labia.   No bleeding and no signs of injury around the vaginal introitus, urethral meatus is normal size and without prolapse or lesions, urethra well supported. The cervix is visualized  with no discharge, lesions or friability.   No vaginal discharge found. Granulation tissue and mesh pal. Ant and posy   No significant Cystocele, Enterocele or rectocele, and cervix and uterus well supported.   Bimanual exam:   The urethra is normal to palpation and there are no palpable vaginal wall masses.   Uterus is not deviated, not enlarged, not fixed, normal shape and not tender.   Cervix exhibits no motion tenderness.    Right adnexum displays no mass or nodularity and no tenderness.   Left adnexum displays no mass or nodularity and no tenderness.  Musculoskeletal: Normal range of motion.   Lymphadenopathy: No inguinal adenopathy present.   Neurological: She is alert and oriented to person, place, and time. Coordination normal.   Skin: Skin is warm and dry. She is not diaphoretic. No rashes, lesions or ulcers.   Psychiatric: She has a normal mood and affect, oriented to person, place, and time.      Assessment:     1. Screening for malignant neoplasm of cervix  Liquid-Based Pap Smear, Screening      2. Encounter for screening mammogram for malignant neoplasm of breast  Mammo Digital Screening Bilat w/ Dieter      3. Encounter for annual routine gynecological examination          Prev A&P repair with granulation and erosion noted - Dr Bender    Plan:   PAP  Mammogram  Use estrace cream  Follow up in 1 year.  Patient informed will be contacted with results within 2 weeks. Encouraged to please call back or email if she has not heard from us by then.

## 2023-06-24 LAB
FINAL PATHOLOGIC DIAGNOSIS: NORMAL
Lab: NORMAL

## 2023-07-05 RX ORDER — ESTRADIOL 0.1 MG/G
1 CREAM VAGINAL
Qty: 42.5 G | Refills: 10 | Status: SHIPPED | OUTPATIENT
Start: 2023-07-05

## 2023-10-11 ENCOUNTER — OFFICE VISIT (OUTPATIENT)
Dept: DERMATOLOGY | Facility: CLINIC | Age: 73
End: 2023-10-11
Payer: COMMERCIAL

## 2023-10-11 VITALS — HEIGHT: 61 IN | BODY MASS INDEX: 45.5 KG/M2 | WEIGHT: 241 LBS

## 2023-10-11 DIAGNOSIS — D22.9 MULTIPLE BENIGN NEVI: ICD-10-CM

## 2023-10-11 DIAGNOSIS — L82.1 SEBORRHEIC KERATOSES: Primary | ICD-10-CM

## 2023-10-11 DIAGNOSIS — L65.9 HAIR THINNING: ICD-10-CM

## 2023-10-11 DIAGNOSIS — Z12.83 SKIN CANCER SCREENING: ICD-10-CM

## 2023-10-11 DIAGNOSIS — L81.4 SOLAR LENTIGO: ICD-10-CM

## 2023-10-11 PROCEDURE — 4010F PR ACE/ARB THEARPY RXD/TAKEN: ICD-10-PCS | Mod: CPTII,S$GLB,, | Performed by: DERMATOLOGY

## 2023-10-11 PROCEDURE — 4010F ACE/ARB THERAPY RXD/TAKEN: CPT | Mod: CPTII,S$GLB,, | Performed by: DERMATOLOGY

## 2023-10-11 PROCEDURE — 99203 PR OFFICE/OUTPT VISIT, NEW, LEVL III, 30-44 MIN: ICD-10-PCS | Mod: S$GLB,,, | Performed by: DERMATOLOGY

## 2023-10-11 PROCEDURE — 3008F PR BODY MASS INDEX (BMI) DOCUMENTED: ICD-10-PCS | Mod: CPTII,S$GLB,, | Performed by: DERMATOLOGY

## 2023-10-11 PROCEDURE — 3288F PR FALLS RISK ASSESSMENT DOCUMENTED: ICD-10-PCS | Mod: CPTII,S$GLB,, | Performed by: DERMATOLOGY

## 2023-10-11 PROCEDURE — 1101F PR PT FALLS ASSESS DOC 0-1 FALLS W/OUT INJ PAST YR: ICD-10-PCS | Mod: CPTII,S$GLB,, | Performed by: DERMATOLOGY

## 2023-10-11 PROCEDURE — 1101F PT FALLS ASSESS-DOCD LE1/YR: CPT | Mod: CPTII,S$GLB,, | Performed by: DERMATOLOGY

## 2023-10-11 PROCEDURE — 1159F MED LIST DOCD IN RCRD: CPT | Mod: CPTII,S$GLB,, | Performed by: DERMATOLOGY

## 2023-10-11 PROCEDURE — 3288F FALL RISK ASSESSMENT DOCD: CPT | Mod: CPTII,S$GLB,, | Performed by: DERMATOLOGY

## 2023-10-11 PROCEDURE — 99203 OFFICE O/P NEW LOW 30 MIN: CPT | Mod: S$GLB,,, | Performed by: DERMATOLOGY

## 2023-10-11 PROCEDURE — 1159F PR MEDICATION LIST DOCUMENTED IN MEDICAL RECORD: ICD-10-PCS | Mod: CPTII,S$GLB,, | Performed by: DERMATOLOGY

## 2023-10-11 PROCEDURE — 3008F BODY MASS INDEX DOCD: CPT | Mod: CPTII,S$GLB,, | Performed by: DERMATOLOGY

## 2023-10-11 PROCEDURE — 1160F RVW MEDS BY RX/DR IN RCRD: CPT | Mod: CPTII,S$GLB,, | Performed by: DERMATOLOGY

## 2023-10-11 PROCEDURE — 1160F PR REVIEW ALL MEDS BY PRESCRIBER/CLIN PHARMACIST DOCUMENTED: ICD-10-PCS | Mod: CPTII,S$GLB,, | Performed by: DERMATOLOGY

## 2023-10-11 NOTE — PROGRESS NOTES
"  Subjective:      Patient ID:  Hallie Calvo is a 73 y.o. female who presents for   Chief Complaint   Patient presents with    Skin Check     TBSC      New Patient    Patient here today for TBSC  C/O dry spot to left dorsal hand x "a while" slightly itchy.    Derm HX:  Denies Phx of NMSC  Denies Fhx of MM    Current Outpatient Medications:   ·  BIOTIN ORAL, every evening., Disp: , Rfl:   ·  calcium carbonate (OS-NAVEEN) 600 mg calcium (1,500 mg) Tab, every evening., Disp: , Rfl:   ·  cetirizine (ZYRTEC) 10 MG tablet, Take 10 mg by mouth every evening., Disp: , Rfl:   ·  diphenhydrAMINE (BENADRYL) 50 MG capsule, Take 50 mg by mouth every 6 (six) hours as needed for Itching., Disp: , Rfl:   ·  esomeprazole (NEXIUM) 40 MG capsule, Take 1 capsule (40 mg total) by mouth once daily., Disp: 30 capsule, Rfl: 6  ·  estradioL (ESTRACE) 0.01 % (0.1 mg/gram) vaginal cream, Place 1 g vaginally 3 (three) times a week. insert 1 gram VAGINALLY THREE TIMES WEEKLY, Disp: 42.5 g, Rfl: 10  ·  ferrous sulfate 325 mg (65 mg iron) Tab tablet, Take 325 mg by mouth every evening., Disp: , Rfl:   ·  KRILL OIL ORAL, Take 1 capsule by mouth every evening., Disp: , Rfl:   ·  meloxicam (MOBIC) 15 MG tablet, Take 1 tablet (15 mg total) by mouth daily as needed for Pain. (Patient taking differently: Take 15 mg by mouth daily as needed for Pain. prn), Disp: 90 tablet, Rfl: 1  ·  multivitamin (THERAGRAN) per tablet, Take 1 tablet by mouth every evening., Disp: , Rfl:   ·  MYRBETRIQ 50 mg Tb24, Take 1 tablet by mouth., Disp: , Rfl:   ·  nystatin-triamcinolone (MYCOLOG II) cream, Apply topically 2 (two) times daily. Apply to affected area 2 times daily, Disp: 60 g, Rfl: 0  ·  olmesartan (BENICAR) 5 MG Tab, Take 1 tablet (5 mg total) by mouth once daily., Disp: 90 tablet, Rfl: 3  ·  oxybutynin (DITROPAN) 5 MG Tab, Take 5 mg by mouth 2 (two) times daily., Disp: , Rfl:   ·  propranoloL (INDERAL LA) 60 MG 24 hr capsule, Take 1 capsule (60 mg total) by mouth " once daily., Disp: 90 capsule, Rfl: 3  ·  rosuvastatin (CRESTOR) 10 MG tablet, Take 1 tablet (10 mg total) by mouth once daily., Disp: 90 tablet, Rfl: 3  ·  temazepam (RESTORIL) 15 mg Cap, Take 1 capsule (15 mg total) by mouth nightly as needed., Disp: 30 capsule, Rfl: 2  ·  tiZANidine (ZANAFLEX) 4 MG tablet, Take 1 tablet (4 mg total) by mouth nightly as needed (muscle spasms/ pain)., Disp: 40 tablet, Rfl: 0  ·  tramadol-acetaminophen 37.5-325 mg (ULTRACET) 37.5-325 mg Tab, Take 1 tablet by mouth every 6 (six) hours as needed for Pain. Quantity prescribed more than 7 day supply? Yes, quantity medically necessary, Disp: 30 tablet, Rfl: 1  ·  TURMERIC ORAL, Take 1 capsule by mouth every evening., Disp: , Rfl:   ·  venlafaxine (EFFEXOR-XR) 150 MG Cp24, Take 1 capsule (150 mg total) by mouth once daily., Disp: 90 capsule, Rfl: 1  ·  vibegron (GEMTESA) 75 mg Tab, Take 75 mg by mouth once daily., Disp: , Rfl:   ·  vitamin D (VITAMIN D3) 1000 units Tab, Take 1,000 Units by mouth every evening., Disp: , Rfl:            Review of Systems   Constitutional:  Positive for fatigue. Negative for fever and chills.   Skin:  Negative for daily sunscreen use.   Hematologic/Lymphatic: Bruises/bleeds easily.       Objective:   Physical Exam   Constitutional: She appears well-developed and well-nourished. No distress.   Neurological: She is alert and oriented to person, place, and time. She is not disoriented.   Psychiatric: She has a normal mood and affect.   Skin:   Areas Examined (abnormalities noted in diagram):   Scalp / Hair Palpated and Inspected  Head / Face Inspection Performed  Neck Inspection Performed  Chest / Axilla Inspection Performed  Abdomen Inspection Performed  Genitals / Buttocks / Groin Inspection Performed  Back Inspection Performed  RUE Inspected  LUE Inspection Performed  RLE Inspected  LLE Inspection Performed  Nails and Digits Inspection Performed                         Diagram Legend     Erythematous scaling  macule/papule c/w actinic keratosis       Vascular papule c/w angioma      Pigmented verrucoid papule/plaque c/w seborrheic keratosis      Yellow umbilicated papule c/w sebaceous hyperplasia      Irregularly shaped tan macule c/w lentigo     1-2 mm smooth white papules consistent with Milia      Movable subcutaneous cyst with punctum c/w epidermal inclusion cyst      Subcutaneous movable cyst c/w pilar cyst      Firm pink to brown papule c/w dermatofibroma      Pedunculated fleshy papule(s) c/w skin tag(s)      Evenly pigmented macule c/w junctional nevus     Mildly variegated pigmented, slightly irregular-bordered macule c/w mildly atypical nevus      Flesh colored to evenly pigmented papule c/w intradermal nevus       Pink pearly papule/plaque c/w basal cell carcinoma      Erythematous hyperkeratotic cursted plaque c/w SCC      Surgical scar with no sign of skin cancer recurrence      Open and closed comedones      Inflammatory papules and pustules      Verrucoid papule consistent consistent with wart     Erythematous eczematous patches and plaques     Dystrophic onycholytic nail with subungual debris c/w onychomycosis     Umbilicated papule    Erythematous-base heme-crusted tan verrucoid plaque consistent with inflamed seborrheic keratosis     Erythematous Silvery Scaling Plaque c/w Psoriasis     See annotation      Assessment / Plan:        Seborrheic keratoses  These are benign inherited growths without a malignant potential. Reassurance given to patient. No treatment is necessary.     Solar lentigo  This is a benign hyperpigmented sun induced lesion. Daily sun protection will reduce the number of new lesions. Treatment of these benign lesions are considered cosmetic.    Multiple benign nevi  Careful dermoscopy evaluation of nevi performed with none identified as needing biopsy today  Monitor for new mole or moles that are becoming bigger, darker, irritated, or developing irregular borders.     Skin cancer  screening  Total body skin examination performed today including at least 12 points as noted in physical examination. No lesions suspicious for malignancy noted.    Hair thinning  Schedule dedicated visit to fully address    Patient instructed in importance in daily broad spectrum sun protection of at least spf 30. Mineral sunscreen ingredients preferred (Zinc +/- Titanium) and can be found OTC.   Recommend Elta MD for daily use on face and neck.  Patient encouraged to wear hat for all outdoor exposure.   Also discussed sun avoidance and use of protective clothing.             Follow up if symptoms worsen or fail to improve.

## 2023-10-23 PROBLEM — R26.2 AMBULATORY DYSFUNCTION: Status: ACTIVE | Noted: 2023-10-23

## 2023-10-23 PROBLEM — R73.03 PREDIABETES: Status: ACTIVE | Noted: 2023-10-23

## 2023-12-07 PROBLEM — R53.1 DECREASED STRENGTH: Status: ACTIVE | Noted: 2023-12-07

## 2024-04-12 RX ORDER — ESTRADIOL 0.1 MG/G
1 CREAM VAGINAL
Qty: 42.5 G | Refills: 0 | Status: SHIPPED | OUTPATIENT
Start: 2024-04-12 | End: 2024-06-06 | Stop reason: SDUPTHER

## 2024-04-12 NOTE — TELEPHONE ENCOUNTER
Refill Decision Note   Hallie Calvo  is requesting a refill authorization.  Brief Assessment and Rationale for Refill:  Approve     Medication Therapy Plan:  Due for an appt      Comments:     Note composed:9:07 AM 04/12/2024

## 2024-05-07 ENCOUNTER — OFFICE VISIT (OUTPATIENT)
Dept: DERMATOLOGY | Facility: CLINIC | Age: 74
End: 2024-05-07
Payer: MEDICARE

## 2024-05-07 VITALS — HEIGHT: 61 IN | WEIGHT: 240.31 LBS | BODY MASS INDEX: 45.37 KG/M2

## 2024-05-07 DIAGNOSIS — L29.9 SCALP PRURITUS: Primary | ICD-10-CM

## 2024-05-07 DIAGNOSIS — L30.9 DERMATITIS: ICD-10-CM

## 2024-05-07 DIAGNOSIS — L29.9 PRURITUS: ICD-10-CM

## 2024-05-07 PROCEDURE — 99214 OFFICE O/P EST MOD 30 MIN: CPT | Mod: AQ,S$GLB,, | Performed by: DERMATOLOGY

## 2024-05-07 RX ORDER — BETAMETHASONE DIPROPIONATE 0.5 MG/G
LOTION TOPICAL
Qty: 60 ML | Refills: 2 | Status: SHIPPED | OUTPATIENT
Start: 2024-05-07

## 2024-05-07 RX ORDER — KETOCONAZOLE 20 MG/ML
SHAMPOO, SUSPENSION TOPICAL
Qty: 120 ML | Refills: 11 | Status: SHIPPED | OUTPATIENT
Start: 2024-05-09

## 2024-05-07 RX ORDER — TRIAMCINOLONE ACETONIDE 1 MG/G
CREAM TOPICAL
Qty: 454 G | Refills: 3 | Status: SHIPPED | OUTPATIENT
Start: 2024-05-07

## 2024-05-07 NOTE — PROGRESS NOTES
Subjective:      Patient ID:  Hallie Calvo is a 73 y.o. female who presents for   Chief Complaint   Patient presents with    Itching     Mostly on scalp but throughout body     LOV 10/11/23 - SK, lentigo, nevi, hair thinning    Patient here today for itching throughout body x 1 year and scalp x several years  Pt states she is itchy all over, it comes and goes, but has no external appearance or rash  Moisturizing daily with Aquaphor, causes are unknown, no changes to detergents, cosmetic products, or environmental.   Cetaphil face wash, aveeno moisturizer, aquaphore on hands and feet, random shampoo, tide for clothing, no perfume or cologne  No identifiable aggravating factor except sweating  No night sweats, no weight loss (gain), unsure whether recent labs, PCP monitors bloodwork      Derm HX:  Denies Phx of NMSC  Denies Fhx of MM    Current Outpatient Medications:   ·  BIOTIN ORAL, every evening., Disp: , Rfl:   ·  calcium carbonate (OS-NAVEEN) 600 mg calcium (1,500 mg) Tab, every evening., Disp: , Rfl:   ·  cetirizine (ZYRTEC) 10 MG tablet, Take 10 mg by mouth every evening., Disp: , Rfl:   ·  diphenhydrAMINE (BENADRYL) 50 MG capsule, Take 50 mg by mouth every 6 (six) hours as needed for Itching., Disp: , Rfl:   ·  esomeprazole (NEXIUM) 40 MG capsule, Take 1 capsule (40 mg total) by mouth once daily., Disp: 30 capsule, Rfl: 6  ·  estradioL (ESTRACE) 0.01 % (0.1 mg/gram) vaginal cream, Place 1 g vaginally 3 (three) times a week. insert 1 gram VAGINALLY THREE TIMES WEEKLY, Disp: 42.5 g, Rfl: 10  ·  ferrous sulfate 325 mg (65 mg iron) Tab tablet, Take 325 mg by mouth every evening., Disp: , Rfl:   ·  KRILL OIL ORAL, Take 1 capsule by mouth every evening., Disp: , Rfl:   ·  meloxicam (MOBIC) 15 MG tablet, Take 1 tablet (15 mg total) by mouth daily as needed for Pain. (Patient taking differently: Take 15 mg by mouth daily as needed for Pain. prn), Disp: 90 tablet, Rfl: 1  ·  multivitamin (THERAGRAN) per tablet, Take 1  tablet by mouth every evening., Disp: , Rfl:   ·  MYRBETRIQ 50 mg Tb24, Take 1 tablet by mouth., Disp: , Rfl:   ·  nystatin-triamcinolone (MYCOLOG II) cream, Apply topically 2 (two) times daily. Apply to affected area 2 times daily, Disp: 60 g, Rfl: 0  ·  olmesartan (BENICAR) 5 MG Tab, Take 1 tablet (5 mg total) by mouth once daily., Disp: 90 tablet, Rfl: 3  ·  oxybutynin (DITROPAN) 5 MG Tab, Take 5 mg by mouth 2 (two) times daily., Disp: , Rfl:   ·  propranoloL (INDERAL LA) 60 MG 24 hr capsule, Take 1 capsule (60 mg total) by mouth once daily., Disp: 90 capsule, Rfl: 3  ·  rosuvastatin (CRESTOR) 10 MG tablet, Take 1 tablet (10 mg total) by mouth once daily., Disp: 90 tablet, Rfl: 3  ·  temazepam (RESTORIL) 15 mg Cap, Take 1 capsule (15 mg total) by mouth nightly as needed., Disp: 30 capsule, Rfl: 2  ·  tiZANidine (ZANAFLEX) 4 MG tablet, Take 1 tablet (4 mg total) by mouth nightly as needed (muscle spasms/ pain)., Disp: 40 tablet, Rfl: 0  ·  tramadol-acetaminophen 37.5-325 mg (ULTRACET) 37.5-325 mg Tab, Take 1 tablet by mouth every 6 (six) hours as needed for Pain. Quantity prescribed more than 7 day supply? Yes, quantity medically necessary, Disp: 30 tablet, Rfl: 1  ·  TURMERIC ORAL, Take 1 capsule by mouth every evening., Disp: , Rfl:   ·  venlafaxine (EFFEXOR-XR) 150 MG Cp24, Take 1 capsule (150 mg total) by mouth once daily., Disp: 90 capsule, Rfl: 1  ·  vibegron (GEMTESA) 75 mg Tab, Take 75 mg by mouth once daily., Disp: , Rfl:   ·  vitamin D (VITAMIN D3) 1000 units Tab, Take 1,000 Units by mouth every evening., Disp: , Rfl:            Review of Systems   Constitutional:  Positive for fatigue. Negative for fever, chills, weight loss and night sweats.   Gastrointestinal:  Positive for indigestion.   Skin:  Positive for itching. Negative for rash and daily sunscreen use.   Hematologic/Lymphatic: Bruises/bleeds easily.       Objective:   Physical Exam   Constitutional: She appears well-developed and  well-nourished. No distress.   Neurological: She is alert and oriented to person, place, and time. She is not disoriented.   Psychiatric: She has a normal mood and affect.   Skin:   Areas Examined (abnormalities noted in diagram):   Scalp / Hair Palpated and Inspected  Head / Face Inspection Performed  Neck Inspection Performed  Chest / Axilla Inspection Performed  Abdomen Inspection Performed  Genitals / Buttocks / Groin Inspection Performed  Back Inspection Performed  RUE Inspected  LUE Inspection Performed  RLE Inspected  LLE Inspection Performed  Nails and Digits Inspection Performed                         Diagram Legend     Erythematous scaling macule/papule c/w actinic keratosis       Vascular papule c/w angioma      Pigmented verrucoid papule/plaque c/w seborrheic keratosis      Yellow umbilicated papule c/w sebaceous hyperplasia      Irregularly shaped tan macule c/w lentigo     1-2 mm smooth white papules consistent with Milia      Movable subcutaneous cyst with punctum c/w epidermal inclusion cyst      Subcutaneous movable cyst c/w pilar cyst      Firm pink to brown papule c/w dermatofibroma      Pedunculated fleshy papule(s) c/w skin tag(s)      Evenly pigmented macule c/w junctional nevus     Mildly variegated pigmented, slightly irregular-bordered macule c/w mildly atypical nevus      Flesh colored to evenly pigmented papule c/w intradermal nevus       Pink pearly papule/plaque c/w basal cell carcinoma      Erythematous hyperkeratotic cursted plaque c/w SCC      Surgical scar with no sign of skin cancer recurrence      Open and closed comedones      Inflammatory papules and pustules      Verrucoid papule consistent consistent with wart     Erythematous eczematous patches and plaques     Dystrophic onycholytic nail with subungual debris c/w onychomycosis     Umbilicated papule    Erythematous-base heme-crusted tan verrucoid plaque consistent with inflamed seborrheic keratosis     Erythematous Silvery  Scaling Plaque c/w Psoriasis     See annotation   Latest Reference Range & Units 01/13/24 18:06   WBC 3.90 - 12.70 K/uL 6.77   RBC 4.00 - 5.40 M/uL 4.20   Hemoglobin 12.0 - 16.0 g/dL 12.4   Hematocrit 37.0 - 48.5 % 39.4   MCV 82 - 98 fL 94   MCH 27.0 - 31.0 pg 29.5   MCHC 32.0 - 36.0 g/dL 31.5 (L)   RDW 11.5 - 14.5 % 13.2   Platelet Count 150 - 450 K/uL 241   MPV 9.2 - 12.9 fL 10.7   Gran % 38.0 - 73.0 % 65.1   Lymph % 18.0 - 48.0 % 21.0   Mono % 4.0 - 15.0 % 10.2   Eos % 0.0 - 8.0 % 2.4   Basophil % 0.0 - 1.9 % 0.7   Immature Granulocytes 0.0 - 0.5 % 0.6 (H)   Gran # (ANC) 1.8 - 7.7 K/uL 4.4   Lymph # 1.0 - 4.8 K/uL 1.4   Mono # 0.3 - 1.0 K/uL 0.7   Eos # 0.0 - 0.5 K/uL 0.2   Baso # 0.00 - 0.20 K/uL 0.05   Immature Grans (Abs) 0.00 - 0.04 K/uL 0.04   nRBC 0 /100 WBC 0   Differential Method  Automated   Sodium 136 - 145 mmol/L 137   Potassium 3.5 - 5.1 mmol/L 3.9   Chloride 95 - 110 mmol/L 101   CO2 22 - 31 mmol/L 26   Anion Gap 5 - 12 mmol/L 10   BUN 7 - 18 mg/dL 28 (H)   Creatinine 0.50 - 1.40 mg/dL 0.97   eGFR >60 mL/min/1.73 m^2 >60   Glucose 70 - 110 mg/dL 110   Calcium 8.4 - 10.2 mg/dL 9.2   ALP 38 - 145 U/L 76   PROTEIN TOTAL 6.0 - 8.4 g/dL 7.3   Albumin 3.5 - 5.2 g/dL 4.4   BILIRUBIN TOTAL 0.2 - 1.3 mg/dL 0.4   AST 14 - 36 U/L 29   ALT 0 - 35 U/L 19   Specimen UA  Urine, Clean Catch   Color, UA Yellow, Straw, Shannon  Yellow   Appearance, UA Clear  Clear   Specific Gravity, UA 1.005 - 1.030  1.025   pH, UA 5.0 - 8.0  6.0   Protein, UA Negative  Negative   Glucose, UA Negative  Negative   Ketones, UA Negative  Negative   Blood, UA Negative  Negative   NITRITE UA Negative  Negative   UROBILINOGEN UA <2.0 EU/dL 1.0   Bilirubin (UA) Negative  Negative   Leukocyte Esterase, UA Negative  2+ !   RBC, UA 0 - 4 /hpf  0 - 4 /hpf 2  2   WBC, UA 0 - 5 /hpf  0 - 5 /hpf 17 (H)  17 (H)   Bacteria, UA Negative /hpf  Negative /hpf Negative  Negative   Squam Epithel, UA /hpf  /hpf 9  9   Hyaline Casts, UA 0 - 1 /lpf  0 - 1 /lpf  2 !  2 !   Microscopic Comment  SEE COMMENT   CULTURE, URINE  Rpt !   (L): Data is abnormally low  (H): Data is abnormally high  !: Data is abnormal  Rpt: View report in Results Review for more information     Latest Reference Range & Units 01/11/23 12:08   TSH 0.400 - 4.000 uIU/mL 1.070      Latest Reference Range & Units 01/11/23 12:08   Vitamin B12 210 - 950 pg/mL >1000 (H)   (H): Data is abnormally high   Latest Reference Range & Units 01/11/23 12:08   Vitamin D 30 - 96 ng/mL 46     Assessment / Plan:        Scalp pruritus  -     betamethasone dipropionate (DIPROLENE) 0.05 % lotion; Apply thin film to scalp QHS PRN itch  Dispense: 60 mL; Refill: 2  -     ketoconazole (NIZORAL) 2 % shampoo; Lather scalp, let sit 5 min, rinse well. 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

## 2024-05-17 PROBLEM — F32.1 MAJOR DEPRESSIVE DISORDER, SINGLE EPISODE, MODERATE: Status: ACTIVE | Noted: 2024-05-17

## 2024-06-04 ENCOUNTER — OFFICE VISIT (OUTPATIENT)
Dept: DERMATOLOGY | Facility: CLINIC | Age: 74
End: 2024-06-04
Payer: MEDICARE

## 2024-06-04 VITALS — BODY MASS INDEX: 44.75 KG/M2 | WEIGHT: 237 LBS | HEIGHT: 61 IN

## 2024-06-04 DIAGNOSIS — L30.9 DERMATITIS: Primary | ICD-10-CM

## 2024-06-04 DIAGNOSIS — L29.9 PRURITUS: ICD-10-CM

## 2024-06-04 DIAGNOSIS — L29.9 SCALP PRURITUS: ICD-10-CM

## 2024-06-04 PROCEDURE — 99213 OFFICE O/P EST LOW 20 MIN: CPT | Mod: AQ,S$GLB,, | Performed by: DERMATOLOGY

## 2024-06-04 NOTE — PROGRESS NOTES
"  Subjective:      Patient ID:  Hallie Calvo is a 74 y.o. female who presents for   Chief Complaint   Patient presents with    Itching     LOV: 5/7/24 Scalp pruritus    Patient here follow up on her itching   Pt states her itching is way less than before  "No longer clawing my skin" feels like it was her detergent now using scent free  Using the ketoconazole shampoo and the lotion, feels like it is really helping  Cetaphil face wash, aveeno moisturizer, aquaphore on hands and feet, random shampoo, tide for clothing (scent free), no perfume or cologne      Derm HX:  Denies Phx of NMSC  Denies Fhx of MM      Current Outpatient Medications:   ·  betamethasone dipropionate (DIPROLENE) 0.05 % lotion, Apply thin film to scalp QHS PRN itch, Disp: 60 mL, Rfl: 2  ·  BIOTIN ORAL, every evening., Disp: , Rfl:   ·  calcium carbonate (OS-NAVEEN) 600 mg calcium (1,500 mg) Tab, every evening., Disp: , Rfl:   ·  cetirizine (ZYRTEC) 10 MG tablet, Take 10 mg by mouth every evening., Disp: , Rfl:   ·  diphenhydrAMINE (BENADRYL) 50 MG capsule, Take 50 mg by mouth every 6 (six) hours as needed for Itching., Disp: , Rfl:   ·  esomeprazole (NEXIUM) 40 MG capsule, Take 1 capsule (40 mg total) by mouth once daily., Disp: 30 capsule, Rfl: 0  ·  estradioL (ESTRACE) 0.01 % (0.1 mg/gram) vaginal cream, Place 1 g vaginally 3 (three) times a week., Disp: 42.5 g, Rfl: 0  ·  ferrous sulfate 325 mg (65 mg iron) Tab tablet, Take 325 mg by mouth every evening., Disp: , Rfl:   ·  hydrOXYzine HCL (ATARAX) 25 MG tablet, Take 1 tablet (25 mg total) by mouth 3 (three) times daily as needed for Anxiety., Disp: 30 tablet, Rfl: 0  ·  ketoconazole (NIZORAL) 2 % shampoo, Lather scalp, let sit 5 min, rinse well. Use 2 times weekly, Disp: 120 mL, Rfl: 11  ·  KRILL OIL ORAL, Take 1 capsule by mouth every evening., Disp: , Rfl:   ·  meloxicam (MOBIC) 15 MG tablet, Take 1 tablet (15 mg total) by mouth daily as needed for Pain. (Patient taking differently: Take 15 mg by " mouth daily as needed for Pain. prn), Disp: 90 tablet, Rfl: 1  ·  multivitamin (THERAGRAN) per tablet, Take 1 tablet by mouth every evening., Disp: , Rfl:   ·  nystatin-triamcinolone (MYCOLOG II) cream, Apply topically 2 (two) times daily. Apply to affected area 2 times daily, Disp: 60 g, Rfl: 0  ·  olmesartan (BENICAR) 5 MG Tab, Take 1 tablet (5 mg total) by mouth once daily., Disp: 90 tablet, Rfl: 3  ·  oxybutynin (DITROPAN) 5 MG Tab, Take 5 mg by mouth 2 (two) times daily., Disp: , Rfl:   ·  propranoloL (INDERAL LA) 60 MG 24 hr capsule, Take 1 capsule (60 mg total) by mouth once daily., Disp: 90 capsule, Rfl: 3  ·  rosuvastatin (CRESTOR) 10 MG tablet, Take 1 tablet (10 mg total) by mouth once daily., Disp: 90 tablet, Rfl: 0  ·  semaglutide, weight loss, (WEGOVY) 0.25 mg/0.5 mL PnIj, Inject 0.25 mg into the skin once a week., Disp: 4 each, Rfl: 0  ·  temazepam (RESTORIL) 15 mg Cap, Take 1 capsule (15 mg total) by mouth nightly as needed., Disp: 30 capsule, Rfl: 2  ·  tiZANidine (ZANAFLEX) 4 MG tablet, Take 1 tablet (4 mg total) by mouth nightly as needed (muscle spasms/ pain)., Disp: 40 tablet, Rfl: 0  ·  tramadol-acetaminophen 37.5-325 mg (ULTRACET) 37.5-325 mg Tab, Take 1 tablet by mouth every 6 (six) hours as needed for Pain. Quantity prescribed more than 7 day supply? Yes, quantity medically necessary, Disp: 30 tablet, Rfl: 1  ·  triamcinolone acetonide 0.1% (KENALOG) 0.1 % cream, AAA bid, Disp: 454 g, Rfl: 3  ·  TURMERIC ORAL, Take 1 capsule by mouth every evening., Disp: , Rfl:   ·  venlafaxine (EFFEXOR-XR) 150 MG Cp24, Take 1 capsule (150 mg total) by mouth once daily., Disp: 90 capsule, Rfl: 1  ·  vitamin D (VITAMIN D3) 1000 units Tab, Take 1,000 Units by mouth every evening., Disp: , Rfl:           Review of Systems   Constitutional:  Positive for fatigue. Negative for fever, chills, weight loss and night sweats.   Gastrointestinal:  Positive for indigestion.   Skin:  Positive for itching. Negative for  rash and daily sunscreen use.   Hematologic/Lymphatic: Bruises/bleeds easily.       Objective:   Physical Exam   Constitutional: She appears well-developed and well-nourished. No distress.   Neurological: She is alert and oriented to person, place, and time. She is not disoriented.   Psychiatric: She has a normal mood and affect.   Skin:   Areas Examined (abnormalities noted in diagram):   Scalp / Hair Palpated and Inspected  Head / Face Inspection Performed  Neck Inspection Performed  Chest / Axilla Inspection Performed  Abdomen Inspection Performed  Genitals / Buttocks / Groin Inspection Performed  Back Inspection Performed  RUE Inspected  LUE Inspection Performed  RLE Inspected  LLE Inspection Performed  Nails and Digits Inspection Performed                         Diagram Legend     Erythematous scaling macule/papule c/w actinic keratosis       Vascular papule c/w angioma      Pigmented verrucoid papule/plaque c/w seborrheic keratosis      Yellow umbilicated papule c/w sebaceous hyperplasia      Irregularly shaped tan macule c/w lentigo     1-2 mm smooth white papules consistent with Milia      Movable subcutaneous cyst with punctum c/w epidermal inclusion cyst      Subcutaneous movable cyst c/w pilar cyst      Firm pink to brown papule c/w dermatofibroma      Pedunculated fleshy papule(s) c/w skin tag(s)      Evenly pigmented macule c/w junctional nevus     Mildly variegated pigmented, slightly irregular-bordered macule c/w mildly atypical nevus      Flesh colored to evenly pigmented papule c/w intradermal nevus       Pink pearly papule/plaque c/w basal cell carcinoma      Erythematous hyperkeratotic cursted plaque c/w SCC      Surgical scar with no sign of skin cancer recurrence      Open and closed comedones      Inflammatory papules and pustules      Verrucoid papule consistent consistent with wart     Erythematous eczematous patches and plaques     Dystrophic onycholytic nail with subungual debris c/w  onychomycosis     Umbilicated papule    Erythematous-base heme-crusted tan verrucoid plaque consistent with inflamed seborrheic keratosis     Erythematous Silvery Scaling Plaque c/w Psoriasis     See annotation      Assessment / Plan:        Dermatitis  Pruritus  Scalp pruritus  All 90% improved with changes implemented at last visit (see below)  Started wearing gloves to handle disinfecting wipes  Continue:  - free and clear detergent  - cerave with or without pramoxine (refrigerate)  - scratch avoidance  - hold febreeze or other scented products  - reeval in 4 weeks  - ROS reassuring, ongoing x one year +, labs reviewed (CBC CMP TSH) and reassuring           No follow-ups on file.

## 2024-06-07 RX ORDER — ESTRADIOL 0.1 MG/G
1 CREAM VAGINAL
Qty: 42.5 G | Refills: 0 | Status: SHIPPED | OUTPATIENT
Start: 2024-06-07

## 2024-06-07 NOTE — TELEPHONE ENCOUNTER
Refill Decision Note   Hallie Calvo  is requesting a refill authorization.  Brief Assessment and Rationale for Refill:  Approve     Medication Therapy Plan:       Medication Reconciliation Completed: No   Comments:     No Care Gaps recommended.     Note composed:8:19 AM 06/07/2024

## 2024-08-02 ENCOUNTER — PATIENT MESSAGE (OUTPATIENT)
Dept: DERMATOLOGY | Facility: CLINIC | Age: 74
End: 2024-08-02
Payer: MEDICARE

## 2024-08-02 ENCOUNTER — TELEPHONE (OUTPATIENT)
Dept: DERMATOLOGY | Facility: CLINIC | Age: 74
End: 2024-08-02
Payer: COMMERCIAL

## 2024-08-02 NOTE — TELEPHONE ENCOUNTER
----- Message from Debbi Dill sent at 8/2/2024 11:24 AM CDT -----  Type:  Patient Returning Call    Who Called:  pt  Who Left Message for Patient:  unknown  Does the patient know what this is regarding?:  yes  Best Call Back Number:  [phone]    Additional Information:  please call and advise--thank you

## 2024-08-05 ENCOUNTER — TELEPHONE (OUTPATIENT)
Dept: DERMATOLOGY | Facility: CLINIC | Age: 74
End: 2024-08-05
Payer: MEDICARE

## 2024-08-18 DIAGNOSIS — L29.9 SCALP PRURITUS: ICD-10-CM

## 2024-08-19 RX ORDER — KETOCONAZOLE 20 MG/ML
SHAMPOO, SUSPENSION TOPICAL
Qty: 120 ML | Refills: 11 | Status: SHIPPED | OUTPATIENT
Start: 2024-08-19

## 2024-08-21 ENCOUNTER — OFFICE VISIT (OUTPATIENT)
Dept: DERMATOLOGY | Facility: CLINIC | Age: 74
End: 2024-08-21
Payer: MEDICARE

## 2024-08-21 VITALS — BODY MASS INDEX: 44.75 KG/M2 | WEIGHT: 237 LBS | HEIGHT: 61 IN

## 2024-08-21 DIAGNOSIS — L65.8 FEMALE PATTERN HAIR LOSS: Primary | ICD-10-CM

## 2024-08-21 PROCEDURE — 99214 OFFICE O/P EST MOD 30 MIN: CPT | Mod: AQ,S$GLB,, | Performed by: DERMATOLOGY

## 2024-08-21 RX ORDER — MINOXIDIL 2.5 MG/1
1.25 TABLET ORAL DAILY
Qty: 45 TABLET | Refills: 3 | Status: SHIPPED | OUTPATIENT
Start: 2024-08-21 | End: 2025-08-21

## 2024-08-21 RX ORDER — FINASTERIDE 5 MG/1
5 TABLET, FILM COATED ORAL DAILY
Qty: 90 TABLET | Refills: 3 | Status: SHIPPED | OUTPATIENT
Start: 2024-08-21 | End: 2025-08-21

## 2024-08-21 NOTE — PROGRESS NOTES
Subjective:      Patient ID:  Hallie Calvo is a 74 y.o. female who presents for   Chief Complaint   Patient presents with    Hair Loss     LOV 6/4/24 Dermatitis, Pruritus, Scalp Pruritus    Patient here today for hair loss x years (40 years+), worsening.   Pt states she's tried Rogaine for months, no resolution.  Sheds a lot    +FH hair thinning in father, pattern  Older sister with similar issues  No h/o breast cancer or FH of breast CA  Angiogram one year ago, clear, no cardiac issues  No h/o thryoid disease  Past issues with anemia    Pt states Dermatitis has resolved no further concerns.       Derm HX:  Denies Phx of NMSC  Denies Fhx of MM    Current Outpatient Medications:   ·  betamethasone dipropionate (DIPROLENE) 0.05 % lotion, Apply thin film to scalp QHS PRN itch, Disp: 60 mL, Rfl: 2  ·  BIOTIN ORAL, every evening., Disp: , Rfl:   ·  calcium carbonate (OS-NAVEEN) 600 mg calcium (1,500 mg) Tab, every evening., Disp: , Rfl:   ·  cetirizine (ZYRTEC) 10 MG tablet, Take 10 mg by mouth every evening., Disp: , Rfl:   ·  diphenhydrAMINE (BENADRYL) 50 MG capsule, Take 50 mg by mouth every 6 (six) hours as needed for Itching., Disp: , Rfl:   ·  esomeprazole (NEXIUM) 40 MG capsule, Take 1 capsule (40 mg total) by mouth once daily., Disp: 30 capsule, Rfl: 6  ·  estradioL (ESTRACE) 0.01 % (0.1 mg/gram) vaginal cream, Place 1 g vaginally 3 (three) times a week., Disp: 42.5 g, Rfl: 0  ·  ferrous sulfate 325 mg (65 mg iron) Tab tablet, Take 325 mg by mouth every evening., Disp: , Rfl:   ·  hydrOXYzine HCL (ATARAX) 25 MG tablet, Take 1 tablet (25 mg total) by mouth 3 (three) times daily as needed for Anxiety., Disp: 30 tablet, Rfl: 0  ·  ketoconazole (NIZORAL) 2 % shampoo, Lather scalp, let sit 5 min, rinse well. Use 2 times weekly, Disp: 120 mL, Rfl: 11  ·  KRILL OIL ORAL, Take 1 capsule by mouth every evening., Disp: , Rfl:   ·  meloxicam (MOBIC) 15 MG tablet, Take 1 tablet (15 mg total) by mouth daily as needed for Pain.  (Patient taking differently: Take 15 mg by mouth daily as needed for Pain. prn), Disp: 90 tablet, Rfl: 1  ·  multivitamin (THERAGRAN) per tablet, Take 1 tablet by mouth every evening., Disp: , Rfl:   ·  nystatin-triamcinolone (MYCOLOG II) cream, Apply topically 2 (two) times daily. Apply to affected area 2 times daily, Disp: 60 g, Rfl: 0  ·  olmesartan (BENICAR) 5 MG Tab, Take 1 tablet (5 mg total) by mouth once daily., Disp: 90 tablet, Rfl: 3  ·  oxybutynin (DITROPAN) 5 MG Tab, Take 5 mg by mouth 2 (two) times daily., Disp: , Rfl:   ·  propranoloL (INDERAL LA) 60 MG 24 hr capsule, Take 1 capsule (60 mg total) by mouth once daily., Disp: 90 capsule, Rfl: 3  ·  rosuvastatin (CRESTOR) 10 MG tablet, Take 1 tablet (10 mg total) by mouth once daily., Disp: 90 tablet, Rfl: 0  ·  semaglutide, weight loss, (WEGOVY) 0.25 mg/0.5 mL PnIj, Inject 0.25 mg into the skin once a week., Disp: 4 each, Rfl: 0  ·  temazepam (RESTORIL) 15 mg Cap, Take 1 capsule (15 mg total) by mouth nightly as needed., Disp: 30 capsule, Rfl: 2  ·  tiZANidine (ZANAFLEX) 4 MG tablet, Take 1 tablet (4 mg total) by mouth nightly as needed (muscle spasms/ pain)., Disp: 40 tablet, Rfl: 0  ·  tramadol-acetaminophen 37.5-325 mg (ULTRACET) 37.5-325 mg Tab, Take 1 tablet by mouth every 6 (six) hours as needed for Pain. Quantity prescribed more than 7 day supply? Yes, quantity medically necessary, Disp: 30 tablet, Rfl: 1  ·  triamcinolone acetonide 0.1% (KENALOG) 0.1 % cream, AAA bid, Disp: 454 g, Rfl: 3  ·  TURMERIC ORAL, Take 1 capsule by mouth every evening., Disp: , Rfl:   ·  venlafaxine (EFFEXOR-XR) 150 MG Cp24, Take 1 capsule (150 mg total) by mouth once daily., Disp: 90 capsule, Rfl: 1  ·  vitamin D (VITAMIN D3) 1000 units Tab, Take 1,000 Units by mouth every evening., Disp: , Rfl:         Review of Systems   Constitutional:  Positive for fatigue. Negative for fever, chills, weight loss and night sweats.   Gastrointestinal:  Positive for indigestion.    Skin:  Negative for itching, rash and daily sunscreen use.   Hematologic/Lymphatic: Bruises/bleeds easily.       Objective:   Physical Exam   Constitutional: She appears well-developed and well-nourished.   Neurological: She is alert and oriented to person, place, and time.   Psychiatric: She has a normal mood and affect.   Skin:   Areas Examined (abnormalities noted in diagram):   Scalp / Hair Palpated and Inspected  Head / Face Inspection Performed            Diagram Legend     Erythematous scaling macule/papule c/w actinic keratosis       Vascular papule c/w angioma      Pigmented verrucoid papule/plaque c/w seborrheic keratosis      Yellow umbilicated papule c/w sebaceous hyperplasia      Irregularly shaped tan macule c/w lentigo     1-2 mm smooth white papules consistent with Milia      Movable subcutaneous cyst with punctum c/w epidermal inclusion cyst      Subcutaneous movable cyst c/w pilar cyst      Firm pink to brown papule c/w dermatofibroma      Pedunculated fleshy papule(s) c/w skin tag(s)      Evenly pigmented macule c/w junctional nevus     Mildly variegated pigmented, slightly irregular-bordered macule c/w mildly atypical nevus      Flesh colored to evenly pigmented papule c/w intradermal nevus       Pink pearly papule/plaque c/w basal cell carcinoma      Erythematous hyperkeratotic cursted plaque c/w SCC      Surgical scar with no sign of skin cancer recurrence      Open and closed comedones      Inflammatory papules and pustules      Verrucoid papule consistent consistent with wart     Erythematous eczematous patches and plaques     Dystrophic onycholytic nail with subungual debris c/w onychomycosis     Umbilicated papule    Erythematous-base heme-crusted tan verrucoid plaque consistent with inflamed seborrheic keratosis     Erythematous Silvery Scaling Plaque c/w Psoriasis     See annotation     Latest Reference Range & Units 01/11/23 12:08   TSH 0.400 - 4.000 uIU/mL 1.070      Latest Reference  Range & Units 01/13/24 18:06   WBC 3.90 - 12.70 K/uL 6.77   RBC 4.00 - 5.40 M/uL 4.20   Hemoglobin 12.0 - 16.0 g/dL 12.4   Hematocrit 37.0 - 48.5 % 39.4   MCV 82 - 98 fL 94   MCH 27.0 - 31.0 pg 29.5   MCHC 32.0 - 36.0 g/dL 31.5 (L)   RDW 11.5 - 14.5 % 13.2   Platelet Count 150 - 450 K/uL 241   MPV 9.2 - 12.9 fL 10.7   (L): Data is abnormally low   Latest Reference Range & Units 01/11/23 12:08   Vitamin D 30 - 96 ng/mL 46     Assessment / Plan:        Female pattern hair loss  -     finasteride (PROSCAR) 5 mg tablet; Take 1 tablet (5 mg total) by mouth once daily.  Dispense: 90 tablet; Refill: 3  -     minoxidiL (LONITEN) 2.5 MG tablet; Take 0.5 tablets (1.25 mg total) by mouth once daily.  Dispense: 45 tablet; Refill: 3    + FH of same  May also have component of chronic TE (venlafaxine, beta blocker, statin)    Finasteride is not approved by the FDA for use in women, however multiple studies have shown benefit when used to treat female pattern hair loss in combination with topical minoxidil. Side effects are infrequent, usually mild, and reversible even with maintenance of treatment. They include decreased libido, breast tenderness, and headache. In premenopausal women, irregular menstruation and spotting may occur. Pregnancy must be avoided while on this medication (risk of feminization of the male fetus)     Discussed side affects of  minoxidil:  Side effects are rare (especially at the low doses used for alopecia) and usually do not require medical attention (report me if they continue or are bothersome):  headache  unusual hair growth, on the face, arm, and back  swelling ankles or periorbital  Headache  Lightheadedness  Pericardial effusion (very rare)           No follow-ups on file.

## 2024-10-31 PROBLEM — R32 URINARY INCONTINENCE: Status: ACTIVE | Noted: 2024-10-31

## 2024-10-31 PROBLEM — K40.20 BILATERAL INGUINAL HERNIA WITHOUT OBSTRUCTION OR GANGRENE: Status: ACTIVE | Noted: 2024-10-31

## 2025-02-14 ENCOUNTER — OFFICE VISIT (OUTPATIENT)
Dept: OBSTETRICS AND GYNECOLOGY | Facility: CLINIC | Age: 75
End: 2025-02-14
Payer: MEDICARE

## 2025-02-14 VITALS
BODY MASS INDEX: 45.24 KG/M2 | DIASTOLIC BLOOD PRESSURE: 110 MMHG | HEIGHT: 61 IN | WEIGHT: 239.63 LBS | SYSTOLIC BLOOD PRESSURE: 168 MMHG

## 2025-02-14 DIAGNOSIS — N32.81 OAB (OVERACTIVE BLADDER): ICD-10-CM

## 2025-02-14 DIAGNOSIS — Z01.419 WELL WOMAN EXAM: Primary | ICD-10-CM

## 2025-02-14 DIAGNOSIS — Z12.4 CERVICAL CANCER SCREENING: ICD-10-CM

## 2025-02-14 DIAGNOSIS — Z12.31 BREAST CANCER SCREENING BY MAMMOGRAM: ICD-10-CM

## 2025-02-14 DIAGNOSIS — N95.2 ATROPHIC VAGINITIS: ICD-10-CM

## 2025-02-14 DIAGNOSIS — N84.2 VAGINAL POLYP: ICD-10-CM

## 2025-02-14 PROCEDURE — 99214 OFFICE O/P EST MOD 30 MIN: CPT | Mod: PBBFAC,PN

## 2025-02-14 PROCEDURE — 99999 PR PBB SHADOW E&M-EST. PATIENT-LVL IV: CPT | Mod: PBBFAC,,,

## 2025-02-14 RX ORDER — ESTRADIOL 0.1 MG/G
1 CREAM VAGINAL
Qty: 42.5 G | Refills: 2 | Status: SHIPPED | OUTPATIENT
Start: 2025-02-14

## 2025-02-14 NOTE — PROGRESS NOTES
HISTORY OF PRESENT ILLNESS:    Hallie Calvo is a 74 y.o. female, , No LMP recorded. Patient is postmenopausal.,  presents for a routine annual exam . Reports a brown vaginal discharge. No vaginal irritation, no itching, burning. No PP. She is incontinent of urine. Daily ditropan. Hx of HTN, elevated BP today, counseled.     Last pap:  - NILM   Last mammogram:  NL TC 3.79%  Last colon ca screenin    Sexually transmitted infection risk: very low risk of STD exposure.     This is the extent of the patient's complaints at this time.     Past Medical History:   Diagnosis Date    Age-related nuclear cataract, bilateral 2016    Anemia     Arthritis     Chronic Left Knee Pain     Depressive disorder, not elsewhere classified     Encounter for blood transfusion     Hemorrhoids     [facility]    History of kidney stones     HTN (hypertension)     Hyperlipidemia     Insomnia     Left Leg Pain     Nonspecific abnormal electrocardiogram (ECG) (EKG)     Obstructive sleep apnea (adult) (pediatric)     uses cpap    Pica     Renal disorder     right solitary kidney; left nephrectomy       Past Surgical History:   Procedure Laterality Date    BELT ABDOMINOPLASTY      CATARACT EXTRACTION      COLONOSCOPY N/A 2/10/2022    Procedure: COLONOSCOPY;  Surgeon: Avi Herman Jr., MD;  Location: [facility] ENDO;  Service: Endoscopy;  Laterality: N/A;    COLONOSCOPY W/ POLYPECTOMY      consult from [facility]    CYSTOCELE REPAIR      ESOPHAGOGASTRODUODENOSCOPY      ESOPHAGOGASTRODUODENOSCOPY  10/2016    GASTRIC BYPASS      LEFT HEART CATHETERIZATION Left 3/21/2023    Procedure: Left heart cath;  Surgeon: Tristan Keith MD;  Location: [facility] CATH;  Service: Cardiovascular;  Laterality: Left;    NEPHRECTOMY Left     ORTHOPEDIC SURGERY Right 1990    x 5 for tib/fib fx right    steroid epidural  1998    x 2    TONSILLECTOMY, ADENOIDECTOMY      UMBILICAL HERNIA REPAIR      VENTRAL  HERNIA REPAIR         MEDICATIONS AND ALLERGIES:      Current Outpatient Medications:     betamethasone dipropionate (DIPROLENE) 0.05 % lotion, Apply thin film to scalp QHS PRN itch, Disp: 60 mL, Rfl: 2    BIOTIN ORAL, every evening., Disp: , Rfl:     calcium carbonate (OS-NAVEEN) 600 mg calcium (1,500 mg) Tab, every evening., Disp: , Rfl:     cetirizine (ZYRTEC) 10 MG tablet, Take 10 mg by mouth every evening., Disp: , Rfl:     diphenhydrAMINE (BENADRYL) 50 MG capsule, Take 50 mg by mouth every 6 (six) hours as needed for Itching., Disp: , Rfl:     esomeprazole (NEXIUM) 40 MG capsule, Take 1 capsule (40 mg total) by mouth once daily., Disp: 30 capsule, Rfl: 2    ferrous sulfate 325 mg (65 mg iron) Tab tablet, Take 325 mg by mouth every evening., Disp: , Rfl:     finasteride (PROSCAR) 5 mg tablet, Take 1 tablet (5 mg total) by mouth once daily., Disp: 90 tablet, Rfl: 3    ketoconazole (NIZORAL) 2 % shampoo, Lather scalp, let sit 5 min, rinse well. Use 2 times weekly, Disp: 120 mL, Rfl: 11    KRILL OIL ORAL, Take 1 capsule by mouth every evening., Disp: , Rfl:     minoxidiL (LONITEN) 2.5 MG tablet, Take 0.5 tablets (1.25 mg total) by mouth once daily., Disp: 45 tablet, Rfl: 3    multivitamin (THERAGRAN) per tablet, Take 1 tablet by mouth every evening., Disp: , Rfl:     nitrofurantoin, macrocrystal-monohydrate, (MACROBID) 100 MG capsule, Take 1 capsule (100 mg total) by mouth 2 (two) times daily. for 7 days, Disp: 14 capsule, Rfl: 0    nystatin-triamcinolone (MYCOLOG II) cream, Apply topically 2 (two) times daily. Apply to affected area 2 times daily, Disp: 60 g, Rfl: 0    olmesartan (BENICAR) 5 MG Tab, Take 1 tablet (5 mg total) by mouth once daily., Disp: 90 tablet, Rfl: 3    propranoloL (INDERAL LA) 60 MG 24 hr capsule, Take 1 capsule (60 mg total) by mouth once daily., Disp: 90 capsule, Rfl: 3    rosuvastatin (CRESTOR) 10 MG tablet, Take 1 tablet (10 mg total) by mouth once daily., Disp: 90 tablet, Rfl: 0     temazepam (RESTORIL) 15 mg Cap, Take 1 capsule (15 mg total) by mouth nightly as needed., Disp: 30 capsule, Rfl: 2    tiZANidine (ZANAFLEX) 4 MG tablet, Take 1 tablet (4 mg total) by mouth nightly as needed (muscle spasms/ pain)., Disp: 40 tablet, Rfl: 0    tramadol-acetaminophen 37.5-325 mg (ULTRACET) 37.5-325 mg Tab, Take 1 tablet by mouth every 6 (six) hours as needed for Pain. Quantity prescribed more than 7 day supply? Yes, quantity medically necessary, Disp: 30 tablet, Rfl: 1    triamcinolone acetonide 0.1% (KENALOG) 0.1 % cream, AAA bid, Disp: 454 g, Rfl: 3    TURMERIC ORAL, Take 1 capsule by mouth every evening., Disp: , Rfl:     venlafaxine (EFFEXOR-XR) 150 MG Cp24, Take 1 capsule (150 mg total) by mouth once daily., Disp: 90 capsule, Rfl: 1    vitamin D (VITAMIN D3) 1000 units Tab, Take 1,000 Units by mouth every evening., Disp: , Rfl:     estradioL (ESTRACE) 0.01 % (0.1 mg/gram) vaginal cream, Place 1 g vaginally 3 (three) times a week., Disp: 42.5 g, Rfl: 2    meloxicam (MOBIC) 15 MG tablet, Take 1 tablet (15 mg total) by mouth daily as needed for Pain. (Patient not taking: Reported on 2/14/2025), Disp: 90 tablet, Rfl: 1    oxybutynin (DITROPAN) 5 MG Tab, Take 1 tablet (5 mg total) by mouth 2 (two) times daily., Disp: 180 tablet, Rfl: 0    Review of patient's allergies indicates:  No Known Allergies    Family History   Problem Relation Name Age of Onset    Thyroid disease Mother      Heart disease Mother      Alzheimer's disease Mother      Arthritis Mother      Depression Mother      Skin cancer Mother      Stroke Father      Hypertension Father      Hyperlipidemia Father      Diabetes Father      Heart disease Father      Arthritis Father      Kidney disease Father          dialysis    Skin cancer Father      Diabetes Sister      Depression Sister      Arthritis Sister      Frontotemporal dementia Sister      Arthritis Sister      Diabetes Brother      Depression Brother      Arthritis Brother       COPD Brother      Ovarian cancer Maternal Aunt  40    Stomach cancer Maternal Grandmother      Ovarian cancer Other  40        cousin       Social History     Socioeconomic History    Marital status:      Spouse name: Michael    Number of children: 3   Occupational History    Occupation: Nurse   Tobacco Use    Smoking status: Never    Smokeless tobacco: Never   Substance and Sexual Activity    Alcohol use: Yes     Alcohol/week: 4.0 standard drinks of alcohol     Types: 4 Glasses of wine per week     Comment: socially    Drug use: No    Sexual activity: Not Currently     Partners: Male     Social Drivers of Health     Financial Resource Strain: Medium Risk (2021)    Overall Financial Resource Strain (CARDIA)     Difficulty of Paying Living Expenses: Somewhat hard   Food Insecurity: No Food Insecurity (2021)    Hunger Vital Sign     Worried About Running Out of Food in the Last Year: Never true     Ran Out of Food in the Last Year: Never true   Transportation Needs: No Transportation Needs (2021)    PRAPARE - Transportation     Lack of Transportation (Medical): No     Lack of Transportation (Non-Medical): No       OB History    Para Term  AB Living   3 3 3         SAB IAB Ectopic Multiple Live Births                  # Outcome Date GA Lbr Avni/2nd Weight Sex Type Anes PTL Lv   3 Term            2 Term            1 Term                   COMPREHENSIVE GYN HISTORY:  PAP History: Denies abnormal Paps.  Infection History: Denies STDs. Denies PID.  Benign History: Denies uterine fibroids. Denies ovarian cysts. Denies endometriosis. Denies other conditions.  Cancer History: Denies cervical cancer. Denies uterine cancer or hyperplasia. Denies ovarian cancer. Denies vulvar cancer or pre-cancer. Denies vaginal cancer or pre-cancer. Denies breast cancer. Denies colon cancer.      ROS:  GENERAL: No weight changes. No swelling. No fatigue. No fever.  BREASTS: No pain. No lumps. No  "discharge.  ABDOMEN: No pain. No nausea. No vomiting. No diarrhea. No constipation.  REPRODUCTIVE: No abnormal bleeding. No pelvic pain.   VULVA: No pain. No lesions. No itching.  VAGINA: No relaxation. No itching. No odor. No discharge. No lesions.  URINARY: No incontinence. No nocturia. No frequency. No dysuria.    BP (!) 168/110 (Patient Position: Sitting)   Ht 5' 1" (1.549 m)   Wt 108.7 kg (239 lb 10.2 oz)   BMI 45.28 kg/m²     PE:  Physical Exam:   Constitutional: She is oriented to person, place, and time. She appears well-developed and well-nourished. No distress.    HENT:   Head: Normocephalic.    Eyes: Pupils are equal, round, and reactive to light.      Pulmonary/Chest: Effort normal. No respiratory distress. She exhibits no mass, no tenderness, no laceration, no edema, no deformity, no swelling and no retraction. Right breast exhibits no inverted nipple, no mass, no nipple discharge, no skin change, no tenderness, no bleeding and no swelling. Left breast exhibits no inverted nipple, no mass, no nipple discharge, no skin change, no tenderness, no bleeding and no swelling.        Abdominal: Soft. She exhibits no distension. There is no abdominal tenderness.     Genitourinary:    Inguinal canal, vagina, uterus, right adnexa and left adnexa normal.      Pelvic exam was performed with patient supine.   The external female genitalia was normal.     Labial bartholins normal.There is no rash, tenderness or lesion on the right labia. There is no rash, tenderness or lesion on the left labia. Cervix is normal. Vagina exhibits lesion (vaginal polyp to right sidewall). No erythema, vaginal discharge (scant, brown), tenderness or bleeding in the vagina.    No foreign body in the vagina.      No signs of injury in the vagina.   Cervix exhibits no motion tenderness, no lesion, no discharge, no friability, no tenderness and no polyp.    pap smear completed          Musculoskeletal: Normal range of motion and moves all " extremeties.       Neurological: She is alert and oriented to person, place, and time.    Skin: Skin is warm and dry.    Psychiatric: She has a normal mood and affect. Judgment and thought content normal.        PROCEDURES/ORDERS:  Pap- diagnostic       Assessment/Plan:    Well woman exam    Breast cancer screening by mammogram  -     Mammo Digital Screening Bilat w/ Dieter (XPD); Future; Expected date: 02/14/2025    Cervical cancer screening  -     Liquid-Based Pap Smear, Diagnostic    Atrophic vaginitis  -     estradioL (ESTRACE) 0.01 % (0.1 mg/gram) vaginal cream; Place 1 g vaginally 3 (three) times a week.  Dispense: 42.5 g; Refill: 2    OAB (overactive bladder)        - Recommend discontinuing Ditropan d/t hx of HTN        - Samples of Gemtesa provided. She will message with response if she desires Rx    Vaginal Polyp       - Discussed exam finding. Encouraged adherence to Estrace regimen       - She will be going for knee replacement surgery this month with Dr. Oneil. Recommend follow up in the next 2-3 months for reexamination.     COUNSELING:  The patient was counseled today on:  -A.C.S. Pap and pelvic exam guidelines, recomendations for yearly mammogram, monthly self breast exams and to follow up with her PCP for other health maintenance.    FOLLOW-UP  annually for [employer].

## 2025-02-20 ENCOUNTER — RESULTS FOLLOW-UP (OUTPATIENT)
Dept: OBSTETRICS AND GYNECOLOGY | Facility: CLINIC | Age: 75
End: 2025-02-20
Payer: MEDICARE

## 2025-02-25 PROBLEM — M17.12 PRIMARY OSTEOARTHRITIS OF LEFT KNEE: Status: ACTIVE | Noted: 2025-02-25

## 2025-03-13 DIAGNOSIS — L65.8 FEMALE PATTERN HAIR LOSS: ICD-10-CM

## 2025-03-13 RX ORDER — MINOXIDIL 2.5 MG/1
1.25 TABLET ORAL DAILY
Qty: 45 TABLET | Refills: 1 | Status: SHIPPED | OUTPATIENT
Start: 2025-03-13 | End: 2026-03-13

## 2025-03-13 RX ORDER — FINASTERIDE 5 MG/1
5 TABLET, FILM COATED ORAL DAILY
Qty: 90 TABLET | Refills: 1 | Status: SHIPPED | OUTPATIENT
Start: 2025-03-13 | End: 2026-03-13

## 2025-03-17 PROBLEM — M25.662 DECREASED RANGE OF MOTION (ROM) OF LEFT KNEE: Status: ACTIVE | Noted: 2025-03-17

## 2025-03-17 PROBLEM — M25.562 LEFT KNEE PAIN: Status: ACTIVE | Noted: 2025-03-17

## 2025-03-17 PROBLEM — R29.898 WEAKNESS OF LEFT LOWER EXTREMITY: Status: ACTIVE | Noted: 2025-03-17

## 2025-04-02 ENCOUNTER — OFFICE VISIT (OUTPATIENT)
Dept: OBSTETRICS AND GYNECOLOGY | Facility: CLINIC | Age: 75
End: 2025-04-02
Payer: MEDICARE

## 2025-04-02 VITALS
DIASTOLIC BLOOD PRESSURE: 82 MMHG | SYSTOLIC BLOOD PRESSURE: 134 MMHG | WEIGHT: 234.81 LBS | BODY MASS INDEX: 44.36 KG/M2

## 2025-04-02 DIAGNOSIS — R87.619 ABNORMAL CERVICAL PAPANICOLAOU SMEAR, UNSPECIFIED ABNORMAL PAP FINDING: Primary | ICD-10-CM

## 2025-04-02 DIAGNOSIS — T83.718S EROSION OF BLADDER SUSPENSION MESH, SEQUELA: ICD-10-CM

## 2025-04-02 PROCEDURE — 99214 OFFICE O/P EST MOD 30 MIN: CPT | Mod: S$PBB,,, | Performed by: OBSTETRICS & GYNECOLOGY

## 2025-04-02 PROCEDURE — 99214 OFFICE O/P EST MOD 30 MIN: CPT | Mod: PBBFAC,PN | Performed by: OBSTETRICS & GYNECOLOGY

## 2025-04-02 PROCEDURE — 99999 PR PBB SHADOW E&M-EST. PATIENT-LVL IV: CPT | Mod: PBBFAC,,, | Performed by: OBSTETRICS & GYNECOLOGY

## 2025-04-02 NOTE — PROGRESS NOTES
Chief Complaint   Patient presents with    Follow-up     F/U recent pap with MEREDITH PANTOJA.       History of Present Illness   74 y.o. WF   patient presents today for ASCUS pap, neg HPV.  Hx of mesh erosion    Past medical and surgical history reviewed.   I have reviewed the patient's medical history in detail and updated the computerized patient record.    Review of patient's allergies indicates:   Allergen Reactions    Hydrocodone Itching         Review of Systems -   GEN ROS: negative  Breast ROS: negative for breast lumps  Genito-Urinary ROS: positive for - vulvar/vaginal symptoms      Physical Examination:  /82 (BP Location: Right arm, Patient Position: Sitting)   Wt 106.5 kg (234 lb 12.6 oz)   BMI 44.36 kg/m²      OBGyn Exam   Abd: non tender, no rebound, no guarding, no organomegaly  V,v: no lesions, mesh erosions  Cervix: no lesions, pap  Uterus: nssp  Adnexa: no masses, non tender  Assessment:    1. Abnormal cervical Papanicolaou smear, unspecified abnormal pap finding        2. Erosion of bladder suspension mesh, sequela            Plan:  Vaginosis test  Pap  Estrace cream  Patient informed will be contacted with results within 2 weeks. Encouraged to please call back or email if she has not heard from us by then.

## 2025-04-08 ENCOUNTER — RESULTS FOLLOW-UP (OUTPATIENT)
Dept: OBSTETRICS AND GYNECOLOGY | Facility: CLINIC | Age: 75
End: 2025-04-08

## 2025-05-06 PROBLEM — D69.2 SENILE PURPURA: Status: ACTIVE | Noted: 2025-05-06

## 2025-05-06 PROBLEM — M17.12 PRIMARY OSTEOARTHRITIS OF LEFT KNEE: Status: RESOLVED | Noted: 2025-02-25 | Resolved: 2025-05-06

## 2025-05-06 PROBLEM — M25.562 LEFT KNEE PAIN: Status: RESOLVED | Noted: 2025-03-17 | Resolved: 2025-05-06

## 2025-05-06 PROBLEM — R29.898 WEAKNESS OF LEFT LOWER EXTREMITY: Status: RESOLVED | Noted: 2025-03-17 | Resolved: 2025-05-06

## 2025-05-06 PROBLEM — M25.662 DECREASED RANGE OF MOTION (ROM) OF LEFT KNEE: Status: RESOLVED | Noted: 2025-03-17 | Resolved: 2025-05-06
